# Patient Record
Sex: MALE | Race: WHITE
[De-identification: names, ages, dates, MRNs, and addresses within clinical notes are randomized per-mention and may not be internally consistent; named-entity substitution may affect disease eponyms.]

---

## 2017-02-24 ENCOUNTER — HOSPITAL ENCOUNTER (OUTPATIENT)
Dept: HOSPITAL 45 - C.CTS | Age: 67
Discharge: HOME | End: 2017-02-24
Attending: OTOLARYNGOLOGY
Payer: COMMERCIAL

## 2017-02-24 DIAGNOSIS — J34.2: Primary | ICD-10-CM

## 2017-02-24 DIAGNOSIS — J32.9: ICD-10-CM

## 2017-02-24 DIAGNOSIS — J31.0: ICD-10-CM

## 2017-02-24 NOTE — DIAGNOSTIC IMAGING REPORT
FUSION CT SINUSES W/O



CLINICAL HISTORY: J31.0 Chronic dkmhofusP22.9 Chronic noplqhvwkH87.2 nasal

septal deviation



COMPARISON STUDY:  No previous studies for comparison.



FINDINGS: Helical images were acquired in the transverse plane. Sagittal and

coronal reformatted images were reviewed.



No orbital lesions are visualized. There is no hydrocephalus.



The mastoid air cells appear symmetrically aerated. The middle ear cavities

appear well aerated.



There is minor bilateral maxillary sinus mucosal thickening.



There is minimal ethmoid and frontal sinus mucosal thickening.



The ostiomeatal units are patent bilaterally. There is minor nasal septal

deviation to the right.



The olfactory recesses measures 6 mm in depth.



The lamina papyracea are intact.



The anterior ethmoidal notches are protected.



There is a left-sided Benjy cell.



IMPRESSION:  

1. No evidence of acute sinusitis

2. Minor mucosal thickening

3. The ostiomeatal units are patent bilaterally 









Electronically signed by:  Andrew Pabon M.D.

2/24/2017 9:51 AM



Dictated Date/Time:  2/24/2017 9:47 AM

## 2017-02-27 ENCOUNTER — HOSPITAL ENCOUNTER (EMERGENCY)
Dept: HOSPITAL 45 - C.EDB | Age: 67
Discharge: HOME | End: 2017-02-27
Payer: COMMERCIAL

## 2017-02-27 VITALS
WEIGHT: 218.92 LBS | HEIGHT: 69.02 IN | BODY MASS INDEX: 32.42 KG/M2 | WEIGHT: 218.92 LBS | HEIGHT: 69.02 IN | BODY MASS INDEX: 32.42 KG/M2

## 2017-02-27 VITALS — OXYGEN SATURATION: 96 % | DIASTOLIC BLOOD PRESSURE: 83 MMHG | HEART RATE: 90 BPM | SYSTOLIC BLOOD PRESSURE: 147 MMHG

## 2017-02-27 VITALS — TEMPERATURE: 98.6 F
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
## 2017-02-27 DIAGNOSIS — S05.01XA: Primary | ICD-10-CM

## 2017-02-28 NOTE — EMERGENCY ROOM VISIT NOTE
ED Visit Note


First contact with patient:  18:09


CHIEF COMPLAINT:  Eye pain








HISTORY OF PRESENT ILLNESS:  This 66-year-old male patient presents to the 

emergency department complaining of pain in the right eye for the past 2 days. 

There has been a constant moderate pain and irritation, redness and tearing in 

the eye.  There is a mild blurring of vision at times and light bothers the 

eye.  The vision has not been decreased over all.  The patient does not wear 

contacts.  The patient rates the pain as dull and 5/10.  The patient has not 

had previous injuries to this eye.  Tetanus shot is reportedly up to date.








REVIEW OF SYSTEMS: A 6 system review of systems was completed with positives 

and pertinent negatives listed in the HPI. 








ALLERGIES: Penicillins








MEDICATIONS: See EMR








PMH: See EMR








SOCIAL HISTORY: Lives locally








PHYSICAL EXAM: Vital Signs: Reviewed Nurse's notes, vital signs stable.  Visual 

acuity 20/20 bilateral.  GENERAL: This is a white male, in no acute distress, 

but who is uncomfortable from the eye problem.  Well-developed well-nourished.  

EYES:  The pupils are equal round and reactive to light and accommodation.  

EOMs are full and without tenderness.  There is discharge of clear tears from 

the right eye which is injected.  There is no foreign body visible under the 

eyelid even after lid eversion.  Funduscopic exam reveals no hemorrhages, 

papilledema, or other abnormalities.  No foreign body was seen embedded in the 

cornea under slit lamp exam.  The cornea was clear and no hyphema was seen.  

Fluorescein uptake was observed with ultraviolet light significant for a 

corneal abrasion at 9:00.








EMERGENCY DEPARTMENT COURSE:  Physical exam and history were performed.  

Nursing notes and EMR were reviewed.  The patient appears to have a corneal 

abrasion of his right eye.  He was started on Ciloxan eyedrops and given 

instructions to follow with his eye doctor or primary care physician with any 

ongoing or persistent symptoms.





Allergies


Coded Allergies:  


     Penicillins (Verified  Allergy, Unknown, 2/5/10)





Vital Signs











  Date Time  Temp Pulse Resp B/P Pulse Ox O2 Delivery O2 Flow Rate FiO2


 


2/27/17 19:06  90 18 147/83 96   


 


2/27/17 17:47 37.0 90 18 153/89 96 Room Air  











Medications Administered











 Medications


  (Trade)  Dose


 Ordered  Sig/Jaya


 Route  Start Time


 Stop Time Status Last Admin


Dose Admin


 


 Ciprofloxacin HCl


  (Ciprofloxacin


 0.3% Op Soln)  2 drops  NOW  ONCE


 OP  2/27/17 19:00


 2/27/17 19:01 DC 2/27/17 19:03


37 DROPS











Departure Information


Impression





 Primary Impression:  


 Corneal abrasion, right





Dispostion


Home / Self-Care





Condition


GOOD





Forms


HOME CARE DOCUMENTATION FORM,                                                 

               IMPORTANT VISIT INFORMATION





Patient Instructions


My [facility]





Additional Instructions





You were seen and evaluated today on an emergency basis only.  This is not a 

substitute for, or an effort to provide, complete comprehensive medical care.  

It is not possible to recognize and treat all injuries or illnesses in a single 

emergency department visit. For this reason it is recommended that you followup 

with your primary care physician or eye doctor this week for ongoing care and 

evaluation.





Use Ciloxan Eye Drops: Instill 1-2 drops into the conjunctival sac every 2 

hours while awake for 2 days and 1-2 drops every 4 hours while awake for the 

next 5 days





For baseline pain relief you may alternate ibuprofen and acetaminophen every 4 

hours for pain control. Take 600 mg ibuprofen (Advil) and then 4 hours later 

take 1000 mg acetaminophen (Tylenol). Do not take more than 3000 mg 

acetaminophen in a single day.  





You are welcome to return to the emergency department anytime with new, 

worsening, or concerning symptoms.

## 2017-07-31 ENCOUNTER — HOSPITAL ENCOUNTER (OUTPATIENT)
Dept: HOSPITAL 45 - C.LAB | Age: 67
Discharge: HOME | End: 2017-07-31
Attending: INTERNAL MEDICINE
Payer: COMMERCIAL

## 2017-07-31 ENCOUNTER — HOSPITAL ENCOUNTER (OUTPATIENT)
Dept: HOSPITAL 45 - C.RAD1850 | Age: 67
Discharge: HOME | End: 2017-07-31
Attending: INTERNAL MEDICINE
Payer: COMMERCIAL

## 2017-07-31 DIAGNOSIS — M54.2: ICD-10-CM

## 2017-07-31 DIAGNOSIS — Z12.5: ICD-10-CM

## 2017-07-31 DIAGNOSIS — E29.1: ICD-10-CM

## 2017-07-31 DIAGNOSIS — M54.5: ICD-10-CM

## 2017-07-31 DIAGNOSIS — M25.511: Primary | ICD-10-CM

## 2017-07-31 DIAGNOSIS — R73.09: Primary | ICD-10-CM

## 2017-07-31 DIAGNOSIS — R73.09: ICD-10-CM

## 2017-07-31 LAB
ALBUMIN/GLOB SERPL: 1.3 {RATIO} (ref 0.9–2)
ALP SERPL-CCNC: 60 U/L (ref 45–117)
ALT SERPL-CCNC: 25 U/L (ref 12–78)
ANION GAP SERPL CALC-SCNC: 7 MMOL/L (ref 3–11)
AST SERPL-CCNC: 26 U/L (ref 15–37)
BASOPHILS # BLD: 0.07 K/UL (ref 0–0.2)
BASOPHILS NFR BLD: 1.1 %
BUN SERPL-MCNC: 21 MG/DL (ref 7–18)
BUN/CREAT SERPL: 22.7 (ref 10–20)
CALCIUM SERPL-MCNC: 8.6 MG/DL (ref 8.5–10.1)
CHLORIDE SERPL-SCNC: 106 MMOL/L (ref 98–107)
CHOLEST/HDLC SERPL: 4 {RATIO}
CO2 SERPL-SCNC: 27 MMOL/L (ref 21–32)
COMPLETE: YES
CREAT SERPL-MCNC: 0.91 MG/DL (ref 0.6–1.4)
CREAT UR-MCNC: 110 MG/DL
EOSINOPHIL NFR BLD AUTO: 265 K/UL (ref 130–400)
GLOBULIN SER-MCNC: 2.8 GM/DL (ref 2.5–4)
GLUCOSE SERPL-MCNC: 100 MG/DL (ref 70–99)
GLUCOSE UR QL: 47 MG/DL
HCT VFR BLD CALC: 44 % (ref 42–52)
IG%: 0.2 %
IMM GRANULOCYTES NFR BLD AUTO: 32.4 %
KETONES UR QL STRIP: 114 MG/DL
LYMPHOCYTES # BLD: 2.14 K/UL (ref 1.2–3.4)
MCH RBC QN AUTO: 29.3 PG (ref 25–34)
MCHC RBC AUTO-ENTMCNC: 33.6 G/DL (ref 32–36)
MCV RBC AUTO: 87.1 FL (ref 80–100)
MONOCYTES NFR BLD: 14.8 %
NEUTROPHILS # BLD AUTO: 3.2 %
NEUTROPHILS NFR BLD AUTO: 48.3 %
NITRITE UR QL STRIP: 147 MG/DL (ref 0–150)
PH UR: 190 MG/DL (ref 0–200)
PMV BLD AUTO: 10.8 FL (ref 7.4–10.4)
POTASSIUM SERPL-SCNC: 4.1 MMOL/L (ref 3.5–5.1)
PSA SERPL-MCNC: 1.96 NG/ML (ref 0–4)
RATIO: 12.5 MCG/MG (ref 0–30)
RBC # BLD AUTO: 5.05 M/UL (ref 4.7–6.1)
SODIUM SERPL-SCNC: 140 MMOL/L (ref 136–145)
TIBC SERPL-MCNC: 322 MCG/DL (ref 250–450)
TSH SERPL-ACNC: 1.31 UIU/ML (ref 0.3–4.5)
VERY LOW DENSITY LIPOPROT CALC: 29 MG/DL
WBC # BLD AUTO: 6.6 K/UL (ref 4.8–10.8)

## 2017-08-09 NOTE — CODING QUERY MEDICAL NECESSITY
SUPPORTING DIAGNOSIS NEEDED





A supporting diagnosis is required for the test/procedure performed on this patient in 
order for us to be reimbursed by the patient's insurance. Please provide a supporting 
diagnosis for the following test/procedure listed below next to the test name along with 
your signature. 



*If there is no additional diagnosis for this patient that would support the following 
test/procedure please document that below next to the test/procedure.



Test(s)/Procedure(s) that require a supporting diagnosis:







* VITAMIN D, 25-HYDROXY                      DIAGNOSIS:







Provider Signature:  ______________________________  Date:  _______



Thank you  

Irasema Rivera

[employer] Information [facility]

Phone:  [phone]

Fax:  [phone]



Once completed, please kindly fax back to [phone]



For questions please call [phone]

## 2017-08-09 NOTE — CODING QUERY MEDICAL NECESSITY
SUPPORTING DIAGNOSIS NEEDED





A supporting diagnosis is required for the test/procedure performed on this patient in 
order for us to be reimbursed by the patient's insurance. Please provide a supporting 
diagnosis for the following test/procedure listed below next to the test name along with 
your signature. 



*If there is no additional diagnosis for this patient that would support the following 
test/procedure please document that below next to the test/procedure.





Test(s)/Procedure(s) that require a supporting diagnosis:





* PSA                              DIAGNOSIS:







Provider Signature:  ______________________________  Date:  _______



Thank you  

Irasema Rivera

[employer] Information [facility]

Phone:  [phone]

Fax:  [phone]



Once completed, please kindly fax back to [phone]



For questions please call [phone]

## 2017-09-11 ENCOUNTER — HOSPITAL ENCOUNTER (OUTPATIENT)
Dept: HOSPITAL 45 - C.LAB | Age: 67
Discharge: HOME | End: 2017-09-11
Attending: INTERNAL MEDICINE
Payer: COMMERCIAL

## 2017-09-11 DIAGNOSIS — E83.19: Primary | ICD-10-CM

## 2017-09-28 ENCOUNTER — HOSPITAL ENCOUNTER (OUTPATIENT)
Dept: HOSPITAL 45 - C.RDSM | Age: 67
Discharge: HOME | End: 2017-09-28
Attending: PHYSICIAN ASSISTANT
Payer: COMMERCIAL

## 2017-09-28 DIAGNOSIS — M79.671: Primary | ICD-10-CM

## 2017-09-28 NOTE — DIAGNOSTIC IMAGING REPORT
R FOOT MIN 3 VIEWS



CLINICAL HISTORY: 67 years-old Male presenting with RIGHT FOOT PAIN. 



TECHNIQUE: Frontal, oblique, and lateral views the right foot were obtained. 



COMPARISON:  3/15/2012.



FINDINGS:

Severe degenerative change of the first metatarsophalangeal joint with complete

joint space loss, osteophytosis, and subchondral sclerosis. Bone spur noted at

the inferior calcaneus and at the insertion of the Achilles tendon. No acute

fracture or malalignment. No radiographic evidence of soft tissue abnormality.



IMPRESSION:

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







Electronically signed by:  Quinton Murray M.D.

9/28/2017 3:20 PM



Dictated Date/Time:  9/28/2017 3:19 PM

## 2017-11-15 ENCOUNTER — HOSPITAL ENCOUNTER (OUTPATIENT)
Dept: HOSPITAL 45 - X.SURG | Age: 67
Discharge: HOME | End: 2017-11-15
Attending: SPECIALIST
Payer: COMMERCIAL

## 2017-11-15 VITALS — TEMPERATURE: 97.7 F

## 2017-11-15 VITALS — OXYGEN SATURATION: 96 % | DIASTOLIC BLOOD PRESSURE: 78 MMHG | SYSTOLIC BLOOD PRESSURE: 120 MMHG | HEART RATE: 68 BPM

## 2017-11-15 VITALS
HEIGHT: 67.99 IN | WEIGHT: 215.46 LBS | HEIGHT: 67.99 IN | BODY MASS INDEX: 32.65 KG/M2 | BODY MASS INDEX: 32.65 KG/M2 | WEIGHT: 215.46 LBS

## 2017-11-15 DIAGNOSIS — H25.12: Primary | ICD-10-CM

## 2017-11-15 RX ADMIN — TROPICAMIDE SCH DROP: 10 SOLUTION/ DROPS OPHTHALMIC at 07:23

## 2017-11-15 RX ADMIN — CYCLOPENTOLATE HYDROCHLORIDE SCH DROP: 10 SOLUTION/ DROPS OPHTHALMIC at 07:19

## 2017-11-15 RX ADMIN — MOXIFLOXACIN HYDROCHLORIDE SCH DROP: 5 SOLUTION/ DROPS OPHTHALMIC at 07:37

## 2017-11-15 RX ADMIN — PHENYLEPHRINE HYDROCHLORIDE SCH DROP: 25 SOLUTION/ DROPS OPHTHALMIC at 07:22

## 2017-11-15 RX ADMIN — PHENYLEPHRINE HYDROCHLORIDE SCH DROP: 25 SOLUTION/ DROPS OPHTHALMIC at 07:17

## 2017-11-15 RX ADMIN — MOXIFLOXACIN HYDROCHLORIDE SCH DROP: 5 SOLUTION/ DROPS OPHTHALMIC at 07:20

## 2017-11-15 RX ADMIN — TROPICAMIDE SCH DROP: 10 SOLUTION/ DROPS OPHTHALMIC at 07:18

## 2017-11-15 RX ADMIN — CYCLOPENTOLATE HYDROCHLORIDE SCH DROP: 10 SOLUTION/ DROPS OPHTHALMIC at 07:24

## 2017-11-15 NOTE — [facility] OPERATIVE REPORT
Operative Report


Date of Service


Nov 15, 2017.





Operative Report


1.  PREOPERATIVE DIAGNOSIS:  Senile nuclear cataract, left eye.  





2.  POSTOPERATIVE DIAGNOSIS:  Senile nuclear cataract, left eye.  





3.  PROCEDURE:  Phacoemulsification of left cataract with posterior chamber 

lens implant, type [employer] Lomb, model MI60L, power +24.5 diopters. 





ANESTHESIA:   Local standby.  SURGEON:  Dr. Brady. COMPLICATIONS:  None.  

OPERATING TIME:  10 minutes.  





4.  OPERATION AND FINDINGS: 





DESCRIPTION OF PROCEDURE:  The left pupil was dilated.  The anesthetic was 

administered using a topical technique.  The left eye was prepped and draped.  

A speculum was placed.  A clear corneal incision was formed.  The chamber was 

filled with Amvisc Plus and Endocoat.  Epinephrine solution was used.  A 

paracentesis was placed.  A capsulorrhexis was performed.  The nucleus was 

hydrodissected.  The lens was removed with phacoemulsification.  Time was 2.70 

seconds.  The aspiration unit was used to remove the cortex.  The capsule was 

filled with Amvisc Plus.  The lens implant was folded and placed into the 

capsule.  The incision was hydrated.  The Amvisc was aspirated.  The wound was 

secure.  The chamber was deep.  The pupil was round.  Brimonidine, TobraDex 

ointment and Vigamox solution were placed.  The speculum was removed.  The 

patient was returned to the Recovery Room in stable condition.


I attest to the content of the Intraoperative Record and any orders documented 

therein.  Any exceptions are noted below.


The scribe's documentation has been prepared in my presence, under my direction 

and personally reviewed by me in its entirety. I confirm that the note above 

accurately reflects all work, treatment, procedures, and medical decision 

making performed by me.








I personally scribed for Mateo Brady M.D. (JEAN CLAUDE) on 11/15/17 at 08:20.  

Electronically submitted by Johana Phillips (MARLIN).

## 2017-11-15 NOTE — DISCHARGE INSTRUCTIONS-SURGCTR
Discharge Instructions


Date of Service


Nov 15, 2017.





Visit


Reason for Visit:  Cataract Left Eye





Discharge


Discharge Diagnosis / Problem:  lens implant left eye





Discharge Goals


Goal(s):  Improve function





Activity Recommendations


Activity Limitations:  resume your previous activity


Lifting Limitations:  no more than 10 pounds


Exercise/Sports Limitations:  gradually increase as tolerated


May Resume Sexual Activity:  when tolerated


Shower/Bathe:  tomorrow


Driving or Machine Use:  resume 1 day after discharge





Anesthesia


.





Post Anesthesia Instructions:





If you have had General Anesthesia or IV Sedation:





*  Do not drive today.


*  Resume driving when surgeon permits.


*  Do not make important decisions or sign legal documents today.


*  Call surgeon for:





   1.  Temperature elevations greater than 101 degrees F.


   2.  Uncontrollable pain.


   3.  Excessive bleeding.


   4.  Persistent nausea and vomiting.


   5.  Medication intolerance (nausea, vomiting or rash).





*  For nausea and vomiting use only clear liquids such as: tea, soda, bouillon 

until nausea subsides, then gradually increase diet as tolerated.





*  If you have any concerns or questions, call your surgeon's office.  If 

physician is unavailable and it is an emergency, call 911 or go to the nearest 

emergency room.





.





Instructions / Follow-Up


Instructions / Follow-Up





ACTIVITY RECOMMENDATIONS:





*  Light activities.





*  Mild irritation and blurred vision are common for the first few days.





*  You may walk outside, read, watch television.





*  Redness around the white part of the eye is common.








MEDICATIONS:





Resume previous medications unless instructed otherwise by your surgeon.





Start all eye drops at 1 pm today:





*  Eye drops (today and tomorrow):


   Prednisone - one drop in operative eye every 3 hours while awake


            Ofloxacin - one drop in operative eye every  3 hours while awake


   


SPECIAL CARE INSTRUCTIONS:





*  Tape plastic shield over eye to sleep at night.  





Call your doctor at [phone] with any concerns or problems.








FOLLOW UP VISIT:





Follow-up with Dr Brady at [place] office as scheduled.





Diet Recommendations


Home Diet:  no limitations





Procedures


Procedures Performed:  


cataract extraction with lens implant





Pending Studies


Studies pending at discharge:  no





Medical Emergencies








.


Who to Call and When:





Medical Emergencies:  If at any time you feel your situation is an emergency, 

please call 911 immediately.





.





Non-Emergent Contact


Non-Emergency issues call your:  Ophthalmologist


Call Non-Emergent contact if:  your pain is not controlled


[phone]


.


.








"Provider Documentation" section prepared by Mateo Brady.








.

## 2017-11-15 NOTE — ANESTHESIA PROGRESS NT - [facility]
Anesthesia Post Op Note


Date & Time


Nov 15, 2017 at 08:47





Vital Signs


Pain Intensity:  0





Vital Signs Past 12 Hours








  Date Time  Temp Pulse Resp B/P (MAP) Pulse Ox O2 Delivery O2 Flow Rate FiO2


 


11/15/17 08:44  68 16 120/78 (92) 96 Room Air  


 


11/15/17 08:19 36.5 68 20 130/73 (92) 94 Room Air  


 


11/15/17 07:06 36.4 70 16 158/85 (109) 96 Room Air  











Notes


Mental Status:  alert / awake / arousable, participated in evaluation


Pt Amnestic to Procedure:  Yes


Nausea / Vomiting:  adequately controlled


Pain:  adequately controlled


Airway Patency, RR, SpO2:  stable & adequate


BP & HR:  stable & adequate


Hydration State:  stable & adequate


Anesthetic Complications:  no major complications apparent

## 2018-01-11 ENCOUNTER — HOSPITAL ENCOUNTER (OUTPATIENT)
Dept: HOSPITAL 45 - C.RAD | Age: 68
Discharge: HOME | End: 2018-01-11
Attending: PHYSICIAN ASSISTANT
Payer: COMMERCIAL

## 2018-01-11 DIAGNOSIS — R05: Primary | ICD-10-CM

## 2018-01-11 NOTE — DIAGNOSTIC IMAGING REPORT
(BARIUM SWALLOW) ESOPHAGUS



CLINICAL HISTORY: R05 CoughPATIENT COUGHING UP WHOLE CHUNKS OF FOOD.

Regurgitation



COMPARISON STUDY:  None



FLUOROSCOPY TIME: 1.3 minutes.



NUMBER OF FLUOROSCOPIC IMAGES:  21



FINDINGS: The patient swallowed effervescent granules and barium without

difficulty. Rapid sequence swallows reveal no evidence of aspiration. No

esophageal masses or ulcerations were visualized. The patient swallowed one half

barium tablet which freely passed the stomach. There is disordered esophageal

motility.



IMPRESSION:  Disordered esophageal motility. Otherwise normal study. 







Electronically signed by:  Andrew Pabon M.D.

1/11/2018 1:34 PM



Dictated Date/Time:  1/11/2018 8:22 AM

## 2018-01-17 ENCOUNTER — HOSPITAL ENCOUNTER (OUTPATIENT)
Dept: HOSPITAL 45 - C.RAD1850 | Age: 68
Discharge: HOME | End: 2018-01-17
Attending: PHYSICIAN ASSISTANT
Payer: COMMERCIAL

## 2018-01-17 DIAGNOSIS — R05: Primary | ICD-10-CM

## 2018-01-17 NOTE — DIAGNOSTIC IMAGING REPORT
CHEST 2 VIEWS ROUTINE



CLINICAL HISTORY: R05 [id number]    



COMPARISON STUDY:  12/7/2016



FINDINGS: The cardiac and mediastinal contours remain stable. Soft tissue

prominence the lower right paraspinal region remains unchanged, and is therefore

of doubtful acute clinical significance. There is no failure. There is no acute

parenchymal consolidation. There are no pleural effusions.[ 



IMPRESSION: No active disease in the chest.







Electronically signed by:  Andrew Pabon M.D.

1/17/2018 2:18 PM



Dictated Date/Time:  1/17/2018 2:17 PM

## 2018-01-19 ENCOUNTER — HOSPITAL ENCOUNTER (OUTPATIENT)
Dept: HOSPITAL 45 - C.LAB1850 | Age: 68
Discharge: HOME | End: 2018-01-19
Attending: PHYSICIAN ASSISTANT
Payer: COMMERCIAL

## 2018-01-19 DIAGNOSIS — R05: Primary | ICD-10-CM

## 2018-02-21 ENCOUNTER — HOSPITAL ENCOUNTER (OUTPATIENT)
Dept: HOSPITAL 45 - C.RDSM | Age: 68
Discharge: HOME | End: 2018-02-21
Attending: ORTHOPAEDIC SURGERY
Payer: COMMERCIAL

## 2018-02-21 DIAGNOSIS — M79.673: ICD-10-CM

## 2018-02-21 DIAGNOSIS — M19.90: Primary | ICD-10-CM

## 2018-02-21 NOTE — DIAGNOSTIC IMAGING REPORT
R FOOT MIN 3 VIEWS



CLINICAL HISTORY: RIGHT FOOT PAIN     



COMPARISON: 9/28/2017



DISCUSSION: No acute fractures are visualized. There is are small calcaneal

spurs. There are progressive advanced arthritic changes at the level of the

first metatarsal phalangeal joint.    



IMPRESSION: Severe, mildly progressive arthritic changes at the level the first

metatarsal phalangeal joint







Electronically signed by:  Andrew Pabon M.D.

2/21/2018 2:23 PM



Dictated Date/Time:  2/21/2018 2:22 PM

## 2018-03-17 ENCOUNTER — HOSPITAL ENCOUNTER (OUTPATIENT)
Dept: HOSPITAL 45 - C.NEUR | Age: 68
Discharge: HOME | End: 2018-03-17
Attending: PHYSICIAN ASSISTANT
Payer: COMMERCIAL

## 2018-03-17 DIAGNOSIS — I10: ICD-10-CM

## 2018-03-17 DIAGNOSIS — R06.83: ICD-10-CM

## 2018-03-17 DIAGNOSIS — G47.10: Primary | ICD-10-CM

## 2018-03-17 DIAGNOSIS — R25.8: ICD-10-CM

## 2018-03-18 NOTE — PAP/PSG TECHNICIAN REPORT
[facility]

Technician Polysomnogram Report



Study name:

None

Report date:

3/18/2018



Study date:

3/17/2018

Referring Physician:

Irasema Rocha PA-C, PA-C



Name:

STARR SWANSON

Interpreting Physician:

Ori Figueroa M.D.



YOB: 1950

Technician:

Kelli [facility].



Sex:

Male







Age:

67

StudyType:

PSG



Weight:

221 lbs









Height:

67 years, Height 5' 7"

Neck Circum:16.5inches







BMI:

34.61







Medications:

Pantoprazole Sodium 40mg, Ranitidine HCl 30mg, EQ Allergy Relief D 10-240mg, Bupropion HCl 150mg, 
Citalopram Hydrobromide 20mg, ASA 81mg, Benzonatate 100mg, Celoxib 200mg, Multivitamin, Valacyclovir 
HCl 1gm, Fortesta 10mg/act gelHydroxyzine HCl 10mg















Patient History





Study started on room air with no ETCO2 monitoring in room #6. 67 yr old male here tonight for a 
diagnostic psg. He complains of EDS, loud snoring and restless legs. He said that he thrashes around 
all night. He stated that he had a sleep study years ago but he was never placed on cpap. His 
ESS=12/24. Neck circ=16.5inches.







Parameters Monitored

NPSG: E1-M2, E2-M1, Fp1-M2, Fp2-M1, F3-M2, F4-M2, F4-M1, C3-M2, C4-M2, C4-M1, O1-M2, O2-M2,

O2-M1, T3-M2, T4-M1, P3-M2, P4-M1, CHIN1, CHIN2, HR, EKG, Legs, PFLOW, SNOR, FLOW, CFLOW,

Tidal Volume, THOR, ABDO, SpO2, PLTH, CPRESS, ETCO2 Wave, ETCO2, pH





Sleep Architecture



Sleep Stages



Time at Lights Off

11:08:48 PM



STAGES

Time (min.)

TST (%)



Time at Lights On

5:39:48 AM



Wake

146.5

--



Total Recording Time (TRT)

391.00 min.



N1

30.5

12



Total Sleep Period (TSP)

349.0 min.



N2

154.0

63



Total Sleep Time (TST)

244.5min.



N3

36.0

15



Awake Time

146.5 min.



REM

24.0

10



Wake after Sleep Onset

104.5 min.











Sleep Efficiency (SE)

63 %











Sleep Onset Latency (SOL)

42.0 min.











Number of Stage 1 Shifts

None











Awakenings

41











Stage Changes

149











Number of REM periods

10



REM

24.0

10



REM Latency

247.0 min.



NREM

220.5

90





Body Position Analysis





Supine

Right

Left

Side

Prone

Vertical



Total Sleep Time (min.)

151.6

73.9

102.5

176.40

0.0

0.0



Total Sleep Time (%)

28%

30%

42%

72

0%

N/A%



Total Sleep Time REM (min.)

22.5

1.5

0.0

None

0.0

0.0



Total Sleep Time NREM (min.)

45.6

72.4

102.5

None

0.0

0.0



Intermittent Wake (min.)

83.5

30.8

32.2

None

0.0

0.0



Total Sleep Period (%)

31%

None





Arousals







Myoclonus (PLM) *







Events

Count

Index



Events

Count

Index



Spontaneous

11

3



Events Awake (PLMW)

390

159.7



Respiratory

24

6.4



Events Asleep w/ Arousal (PLMA)

60

14.7



PLM

55

15



Events Asleep w/o Arousal (PLMS)

485

119.0



Snoring

3

1



Total Asleep

545

133.7



Total

93

23



Total

935

143







Respiratory Analysis *





CA

OA

MA

CH

H

RERA

Total



Count

0

8

0

0

46

0

54



Index

0.0

2.0

0.0

0

11.3

0

13.3



Mean Duration

0.0

21.3

0.0

0.00

26.8

0.0

26.0



Longest Duration

0.0

28.4

0.0

0.00

0.0

0.0

54.8







Respiratory Event Summary 







Total

Supine

~Supine

Right

Left

Prone

REM

NREM



Apneas

Count

8

6

2

2

0

N/A

1

7





Index

2.0

5

1

1.6

0.0

N/A

3

2



Hypopneas (4% Desat)

Count

46

37

9

4

5

N/A

16

30





Index

11.3

32.6

3

3.2

2.9

N/A

40.0

8.2



Apneas & All Hypopneas 

Count

54

43

11

6

5

N/A

17

37





Index

13.3

38

4

5

3

N/A

42.5

10.1



Respiratory Events 

(Apn+All Hyp+RERA)

Count

54

43

11

6

5

N/A

17

37





Index

13.3

38

4

4.9

2.9

N/A

42.5

10.1



Respiratory Related Arousal

Count

24

43

4

3

1

N/A

5

21





Index

6.4

19

1

2

1

N/A

13

6





Snoring Analysis





Supine

Right

Left

Prone

REM

NREM

Total



Snore duration

6.2 min



Snores count

80

48

16

N/A

48

96

144



Snore mean duration

2.6 Sec



Snores index

70

39

9

N/A

120.0

26.1

35.3



TST with snoring (%)

2.6%







Desaturation Event Summary:



Minimum %SpO2

Event Count

Mean/Min/Max Duration(sec.)

Desaturation Index

% Time In Bed



> 90

85

24.0 / 8.5 / 57.0

16.0

85.5



86 - 90

15

15.6 / 7.0 / 25.3

18.1

13.4



81 - 85

1

4.5 / 4.5 / 4.5

14.4

1.1



76 - 80

0

N/A

0.0

0.0



71 - 75

0

N/A

0.0

0.0



66 - 70

0

N/A

0.0

0.0



61 - 65

0

N/A

0.0

0.0



56 - 60

0

N/A

0.0

0.0



51 - 55

0

N/A

0.0

0.0



< 50

0

N/A

0.0

0.0









Total

REM

NREM

Awake



<50%

0.0 min.



51 - 60%

0.0 min.



61 - 70%

0.0 min.



71 - 80%

0.0 min.



81 - 90%

53.8 min.

12.5 min.

33.9 min.

7.4 min.



91 - 100%

317.9 min.

11.4 min.

182.5 min.

124.0 min.



Average

93

90

92

93



Minimum SpO2

81

84

81

81



Desaturation Event Index

14.1

57.5

12.8

9.4



# Desat. Events below 89%

42

23

12

7



Time(%) with Saturation below 89%

9.0

1.8

6.3

0.9



Time(min.) with Saturation below 89%

33.3

6.6

23.5

3.2









Time (mins)

REM (mins)

NREM (mins)

% of TST



SpO2 Below 90%

50

23

N27

14.4



SpO2 Below 88%

13

0

0

9





Heart Rate Analysis









Min (bpm)

Max (bpm)

Average (bpm)



Awake

52

180

64



NREM

51

74

61



REM

51

70

59



Overall

51

74

61













Supplemental O2 Values



Minimum O2 level: None



Value  

Start Time

End Time





Technician Comments





Mr. Swanson slept in the right, left and supine positions.  No cardiac arrhythmia noted.  PLM's 
were noted. His arms and legs moved all night.  No bruxism noted.  Snoring was noted and scored as a 
2 on a scale of 1 through 5. (0=no snoring, 5=snoring loud enough to be heard through a closed door 
or down the [address]).  He awoke to use the restroom 1 time during the night.  He stated that he 
slept a little worse than usual.

The final report will be interpreted and signed by a sleep physician. The completed physician report 
will then be placed in the patient medical record.







 



 



 



 



 



 



 



 

 



Therapy (cm H2O)

0



TIB (min.)

391.0



TST (min.)

244.5



Sleep Onset (min.)

42.0



REM Onset From Sleep (min.)

247.0



Sleep Efficiency %

63



Wakefulness (%)

37



Wakefulness (min.)

146.5



NREM 1 (%)

12



NREM 1 (min.)

30.5



NREM 2 (%)

63



NREM 2 (min.)

154.0



NREM 3 (%)

15



NREM 3 (min.)

36.0







REM (%)

10



REM (min.)

24.0



# Arousals

93



Arousal Index

23



# Snore

144



Snore Index

35.3



AHI

13.3



AHI Supine

38



AHI Non-Supine

4



NREM AHI

10.1



REM AHI

42.5



RDI

13.3



# Obstructive Apnea

8



# Central Apnea

0



# Mixed Apnea

0







# Hypopneas

46



RERAs

0



Total Respiratory Events

58



Time Below SpO2 89% (min.)

30.1



Mean NREM SpO2 (%)

92



Mean REM SpO2 (%)

90



Mean Sleep SpO2 (%)

92



Min NREM SpO2 (%)

81



Min REM SpO2 (%)

84



Position Supine (min.)

151.6



Position Non-supine (min.)

176.4



LM Index Sleep

133.7



LM Index NREM

143.1



LM Index REM

47.5







Mean Heart Rate (bpm)

61



Min Heart Rate (bpm)

51

## 2018-03-19 NOTE — POLYSOMNOGRAPH REPORT
CLINICAL DATA:  A 67-year-old male with BMI of 34.6 referred by Irasema Rocha and Dr. Galan with complaints of excessive daytime sleepiness, loud

snoring, and leg movements at night.  He states he thrashes throughout the

night.  He is tired through the day.  His [place] sleepiness score is 12/24.

 

SLEEP ARCHITECTURE:  Total sleep period was 349 minutes.  Total sleep time

was 244.5 minutes divided between 220.5 minutes of non-REM sleep and 24

minutes of REM sleep.  Sleep onset latency was delayed at 42 minutes.  REM

latency was 247 minutes.  Sleep efficiency was reduced at 63%.  Wake after

sleep onset was 104.5 minutes.  Sleep consisted of stage N1 12%, stage N2

63%, stage N3 15%, and REM 10%.

 

AROUSAL DATA:  93 arousals were recorded for an index of 23 per hour.  55

were due to PLMs.  24 were due to respiratory events.

 

PLM DATA:  Very severe PLMD was noted.  There were 545 limb movements during

sleep noted for an index of 103.7 per hour with arousal index of 14.7 per

hour.

 

RESPIRATORY DATA:  Mild sleep apnea was documented.  The AHI was 13.  There

were 46 hypopneic episodes with a mean duration of 26.8 seconds.  There were

8 obstructive apneic episodes.  The longest apneic episode was 28 seconds.

 

OXIMETRY DATA:  Nocturnal hypoxemia was seen.  Oxygen abigail was 81% during

non-REM sleep.  Mean saturation was 93%.  Time below 88% was 13 minutes.

 

EKG:  Heart rates ranged from 51-74 beats per minute.  No arrhythmias were

noted.

 

TECHNICIAN'S COMMENTS:  The patient slept in the right, left, and supine

position.  His arms and legs moved throughout the entire night.  Severe PLMs

were noted with frequent arousals and awakenings.  Snoring was mild, rated 2

on a scale of 1-5.  No bruxism was noted.

 

IMPRESSION:

1.  Mild sleep apnea/hypopnea with an AHI of 13.

2.  Severe PLMD with a PLM index of 134 per hour with an arousal index of 15

per hour.

 

RECOMMENDATIONS:  The patient could be considered for treatment of his PLMD

and his mild sleep apnea.  Clinical correlation is needed.

 

 

 

[facility]

## 2018-07-19 ENCOUNTER — HOSPITAL ENCOUNTER (OUTPATIENT)
Dept: HOSPITAL 45 - C.RAD1850 | Age: 68
Discharge: HOME | End: 2018-07-19
Attending: STUDENT IN AN ORGANIZED HEALTH CARE EDUCATION/TRAINING PROGRAM
Payer: COMMERCIAL
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
## 2018-07-19 DIAGNOSIS — S63.512A: ICD-10-CM

## 2018-07-19 DIAGNOSIS — M18.12: ICD-10-CM

## 2018-07-19 DIAGNOSIS — S69.92XA: Primary | ICD-10-CM

## 2018-07-19 NOTE — DIAGNOSTIC IMAGING REPORT
LEFT WRIST 5 VIEWS



HISTORY:      Left wrist pain.



COMPARISON: None.



FINDINGS: There is no fracture or dislocation. Severe osteoarthritis at the

first carpometacarpal joint demonstrated by cartilage space narrowing with

bone-on-bone articulation and marginal osteophytes. Soft tissue swelling within

the wrist. The scaphoid appears intact. The scapholunate interval measures up to

3.6 mm. This is consistent with scapholunate dissociation. Mild osteoarthritis

at the radiocarpal joint. No radiopaque foreign bodies.



IMPRESSION:  



1. Scapholunate dissociation.

2. No fracture or dislocation within the left wrist.

3. Severe osteoarthritis at the first carpometacarpal joint.







Electronically signed by:  Ceasar Mills M.D.

7/19/2018 2:48 PM



Dictated Date/Time:  7/19/2018 2:45 PM

## 2018-07-25 ENCOUNTER — HOSPITAL ENCOUNTER (OUTPATIENT)
Dept: HOSPITAL 45 - C.MRIBC | Age: 68
Discharge: HOME | End: 2018-07-25
Attending: ORTHOPAEDIC SURGERY
Payer: COMMERCIAL
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
## 2018-07-25 DIAGNOSIS — S93.602A: Primary | ICD-10-CM

## 2018-07-25 NOTE — DIAGNOSTIC IMAGING REPORT
MRI OF THE LEFT ANKLE AND HINDFOOT WITHOUT CONTRAST



CLINICAL HISTORY: Persistent left foot pain.    



COMPARISON STUDY:  MRI of the left ankle November 10, 2008 and left foot

radiographs July 23, 2018.



TECHNIQUE: Utilizing a 1.5 Emely magnet and dedicated coil, multiplanar,

multiecho imaging of the left ankle and hindfoot was performed without

intravenous or intraarticular contrast.



FINDINGS: Please note that the MRI of the left forefoot/midfoot will be reported

separately. Alignment of the left ankle is anatomic. There is no evidence for

acute fracture. Irregularity distal fibula may reflect an old, healed fracture.

There is mild subchondral signal abnormality of the lateral talar dome which has

diminished since exam of November 10, 2008. This is likely chronic. There is

marked thickening of the distal Achilles tendon with multiple foci of

intermediate signal intensity. This has progressed since previous MRI of

November 10, 2008. Mild plantar calcaneal spurring is noted. Subtalar joint is

intact. Flexor and peroneal tendons are intact. There is a complete tear with

avulsion of the distal tibialis anterior tendon. Otherwise, the extensor tendons

are intact. There is adjacent soft tissue edema. No suspicious marrow

replacement is present.



IMPRESSION:  



1. Complete tear with avulsion of the distal tibialis anterior tendon with

associated soft tissue edema.



2. Marked thickening of the distal Achilles with foci of intermediate signal

intensity consistent with Achilles tendinopathy.



3. Mild subchondral signal abnormality within the lateral talar dome which has

diminished since exam of November 10, 2008.







Electronically signed by:  Vinicius Morelos M.D.

7/25/2018 2:20 PM



Dictated Date/Time:  7/25/2018 2:08 PM

## 2018-07-25 NOTE — DIAGNOSTIC IMAGING REPORT
L LOWER EXT NONJOINT WITHOUT



CLINICAL HISTORY: 68 years-old Male with LEFT FOOT PAIN-ENTIRE FOOT   ANKLE. 

Acute left foot and ankle pain



COMPARISON: Left foot radiographs 7/23/2018



TECHNIQUE: Multiplanar, multi sequence MRI of the left forefoot was performed

without contrast.  



FINDINGS: 



 localizer images demonstrate no gross abnormality. Moderate osteoarthritis

about the first MTP joint with chondral thinning and marginal osteophytosis.

Mild subcortical cystic change/edema is noted about the lateral and central

joint space. Mild hallux valgus deformity. Moderate general changes involve the

hallux sesamoid articulation with the first metatarsal head. Mild joint space

narrowing with chondral thinning is noted throughout the interphalangeal joints.

No acute fracture or dislocation. Mild chondral thinning with marginal

osteophytosis about the imaged mid foot structures.



Full thickness tear of the tibialis anterior with moderate intrasubstance and

peritendinous edema. The imaged flexor tendons appear intact. No evidence of

perineural fibrosis (Huizar neuroma) or definite intermetatarsal bursitis.

Plantar plates appear to be intact. Moderate subcutaneous edema about the dorsal

foot. Lisfranc ligament is identified and appears intact.



IMPRESSION:

1. Full-thickness tear of the tibialis anterior with moderate intrasubstance and

peritendinous edema, likely reactive.

2. Hallux valgus deformity with moderate first MTP joint osteoarthritis.

3. Subcutaneous edema about the dorsal foot is likely reactive.



The above report was generated using voice recognition software. It may contain

grammatical, syntax or spelling errors.











Electronically signed by:  Deion Gonzalez M.D.

7/25/2018 2:20 PM



Dictated Date/Time:  7/25/2018 2:11 PM

## 2018-08-16 ENCOUNTER — HOSPITAL ENCOUNTER (INPATIENT)
Dept: HOSPITAL 45 - C.EDB | Age: 68
LOS: 2 days | Discharge: HOME | DRG: 378 | End: 2018-08-18
Attending: HOSPITALIST | Admitting: HOSPITALIST
Payer: COMMERCIAL

## 2018-08-16 VITALS
TEMPERATURE: 98.42 F | OXYGEN SATURATION: 100 % | HEART RATE: 77 BPM | SYSTOLIC BLOOD PRESSURE: 133 MMHG | DIASTOLIC BLOOD PRESSURE: 75 MMHG

## 2018-08-16 VITALS
HEART RATE: 69 BPM | TEMPERATURE: 98.42 F | DIASTOLIC BLOOD PRESSURE: 65 MMHG | SYSTOLIC BLOOD PRESSURE: 137 MMHG | OXYGEN SATURATION: 100 %

## 2018-08-16 VITALS
WEIGHT: 192.68 LBS | WEIGHT: 192.68 LBS | BODY MASS INDEX: 30.24 KG/M2 | HEIGHT: 67 IN | BODY MASS INDEX: 30.24 KG/M2 | HEIGHT: 67 IN

## 2018-08-16 VITALS
TEMPERATURE: 98.24 F | HEART RATE: 66 BPM | OXYGEN SATURATION: 100 % | SYSTOLIC BLOOD PRESSURE: 143 MMHG | DIASTOLIC BLOOD PRESSURE: 92 MMHG

## 2018-08-16 VITALS
HEART RATE: 66 BPM | OXYGEN SATURATION: 100 % | TEMPERATURE: 98.24 F | SYSTOLIC BLOOD PRESSURE: 143 MMHG | DIASTOLIC BLOOD PRESSURE: 92 MMHG

## 2018-08-16 DIAGNOSIS — Z88.0: ICD-10-CM

## 2018-08-16 DIAGNOSIS — K22.4: ICD-10-CM

## 2018-08-16 DIAGNOSIS — I10: ICD-10-CM

## 2018-08-16 DIAGNOSIS — K26.4: Primary | ICD-10-CM

## 2018-08-16 DIAGNOSIS — Z82.49: ICD-10-CM

## 2018-08-16 DIAGNOSIS — G25.81: ICD-10-CM

## 2018-08-16 DIAGNOSIS — K21.9: ICD-10-CM

## 2018-08-16 DIAGNOSIS — I25.10: ICD-10-CM

## 2018-08-16 DIAGNOSIS — F32.9: ICD-10-CM

## 2018-08-16 DIAGNOSIS — F41.9: ICD-10-CM

## 2018-08-16 DIAGNOSIS — D62: ICD-10-CM

## 2018-08-16 DIAGNOSIS — N40.0: ICD-10-CM

## 2018-08-16 DIAGNOSIS — Z88.6: ICD-10-CM

## 2018-08-16 DIAGNOSIS — E11.9: ICD-10-CM

## 2018-08-16 DIAGNOSIS — K92.1: ICD-10-CM

## 2018-08-16 DIAGNOSIS — F10.239: ICD-10-CM

## 2018-08-16 DIAGNOSIS — Z96.652: ICD-10-CM

## 2018-08-16 DIAGNOSIS — Z88.2: ICD-10-CM

## 2018-08-16 LAB
ALBUMIN SERPL-MCNC: 3.2 GM/DL (ref 3.4–5)
ALP SERPL-CCNC: 49 U/L (ref 45–117)
ALT SERPL-CCNC: 25 U/L (ref 12–78)
AST SERPL-CCNC: 24 U/L (ref 15–37)
BASOPHILS # BLD: 0.07 K/UL (ref 0–0.2)
BASOPHILS NFR BLD: 0.7 %
BUN SERPL-MCNC: 31 MG/DL (ref 7–18)
CALCIUM SERPL-MCNC: 7.9 MG/DL (ref 8.5–10.1)
CO2 SERPL-SCNC: 26 MMOL/L (ref 21–32)
CREAT SERPL-MCNC: 0.96 MG/DL (ref 0.6–1.4)
EOS ABS #: 0.18 K/UL (ref 0–0.5)
EOSINOPHIL NFR BLD AUTO: 277 K/UL (ref 130–400)
GLUCOSE SERPL-MCNC: 115 MG/DL (ref 70–99)
HCT VFR BLD CALC: 27.2 % (ref 42–52)
HCT VFR BLD CALC: 30 % (ref 42–52)
HGB BLD-MCNC: 10 G/DL (ref 14–18)
HGB BLD-MCNC: 8.8 G/DL (ref 14–18)
IG#: 0.05 K/UL (ref 0–0.02)
IMM GRANULOCYTES NFR BLD AUTO: 31.9 %
INR PPP: 1 (ref 0.9–1.1)
LIPASE: 221 U/L (ref 73–393)
LYMPHOCYTES # BLD: 3.17 K/UL (ref 1.2–3.4)
MCH RBC QN AUTO: 28.9 PG (ref 25–34)
MCHC RBC AUTO-ENTMCNC: 33.3 G/DL (ref 32–36)
MCV RBC AUTO: 86.7 FL (ref 80–100)
MONO ABS #: 0.73 K/UL (ref 0.11–0.59)
MONOCYTES NFR BLD: 7.4 %
NEUT ABS #: 5.73 K/UL (ref 1.4–6.5)
NEUTROPHILS # BLD AUTO: 1.8 %
NEUTROPHILS NFR BLD AUTO: 57.7 %
PMV BLD AUTO: 10.5 FL (ref 7.4–10.4)
POTASSIUM SERPL-SCNC: 4.1 MMOL/L (ref 3.5–5.1)
PROT SERPL-MCNC: 6 GM/DL (ref 6.4–8.2)
PTT PATIENT: 22.9 SECONDS (ref 21–31)
RED CELL DISTRIBUTION WIDTH CV: 14.6 % (ref 11.5–14.5)
RED CELL DISTRIBUTION WIDTH SD: 45.3 FL (ref 36.4–46.3)
SODIUM SERPL-SCNC: 142 MMOL/L (ref 136–145)
WBC # BLD AUTO: 9.93 K/UL (ref 4.8–10.8)

## 2018-08-16 RX ADMIN — LORAZEPAM PRN MG: 2 INJECTION INTRAMUSCULAR; INTRAVENOUS at 22:06

## 2018-08-16 RX ADMIN — DEXTROSE MONOHYDRATE, SODIUM CHLORIDE, AND POTASSIUM CHLORIDE SCH MLS/HR: 50; 9; 1.49 INJECTION, SOLUTION INTRAVENOUS at 22:32

## 2018-08-16 RX ADMIN — PANTOPRAZOLE SODIUM SCH MLS/HR: 40 INJECTION, POWDER, FOR SOLUTION INTRAVENOUS at 16:31

## 2018-08-16 RX ADMIN — ROPINIROLE HYDROCHLORIDE SCH MG: 0.25 TABLET, FILM COATED ORAL at 22:44

## 2018-08-16 RX ADMIN — CITALOPRAM HYDROBROMIDE SCH MG: 20 TABLET ORAL at 22:44

## 2018-08-16 RX ADMIN — PANTOPRAZOLE SODIUM SCH MLS/HR: 40 INJECTION, POWDER, FOR SOLUTION INTRAVENOUS at 21:44

## 2018-08-16 RX ADMIN — BUPROPION HYDROCHLORIDE SCH MG: 150 TABLET, EXTENDED RELEASE ORAL at 22:43

## 2018-08-16 RX ADMIN — PANTOPRAZOLE SODIUM SCH MLS/HR: 40 INJECTION, POWDER, FOR SOLUTION INTRAVENOUS at 16:59

## 2018-08-16 RX ADMIN — ALUMINUM ZIRCONIUM TRICHLOROHYDREX GLY SCH EA: 0.2 STICK TOPICAL at 23:01

## 2018-08-16 NOTE — DIAGNOSTIC IMAGING REPORT
CHEST ONE VIEW PORTABLE



CLINICAL HISTORY: GI bleed.    



COMPARISON STUDY:  Chest radiograph January 17, 2018.



FINDINGS: Lung volumes are normal. There is no pneumothorax or pleural effusion.

There is no consolidation to suggest pneumonia. Pulmonary vascularity is normal.

Appearance of the chest is unchanged. 



IMPRESSION:  No acute cardiopulmonary findings. 









Electronically signed by:  Vinicius Morelos M.D.

8/16/2018 3:48 PM



Dictated Date/Time:  8/16/2018 3:43 PM

## 2018-08-16 NOTE — HISTORY AND PHYSICAL
History & Physical


Date & Time of Service:


Aug 16, 2018 at 19:53


Chief Complaint:


Sob,Black Stools


Primary Care Physician:


Mateo Caballero M.D.


History of Present Illness


Source:  patient, hospital records, other


69 y/o M Hx HTN, ETOH abuse, GERD, esophageal dysmotility.  Had some abdominal 

fullness 3 days prior followed by onset of black stool.  On the day of 

admission he experience sweats and exertional dyspnea and presented to the ER.  

He has not had nausea/vomiting or fevers.  He admits to drinking a bottle of 

wine daily.


An initial Hb in the ER was 10 from a baseline of 15.





Past Medical/Surgical History


1) HTN


2) Alcohol abuse


3) Esophageal dysmotility


4) Restless leg syndrome


5) GERD


6) Anxiety disorder





Surgical:


1) H/O arthroscopy of right knee


2) History of Achilles tendon repair


3) History of bilateral carpal tunnel release


4) History of cataract surgery


5) History of left knee replacement


6) History of tonsillectomy





Family History





FH: cancer


FH: heart disease


FH: hypertension


Father  due to complications of Alzheimer's 


Mother  due to Melanoma





Social History


Quit smoking  - drinks a bottle of wine daily


Smoking Status:  Never Smoker


Marital Status:  


Occupational Status:  employed





Immunizations


History of Influenza Vaccine:  Yes


History of Tetanus Vaccine?:  Yes


History of Pneumococcal:  Unknown


History of Hepatitis B Vaccine:  No





Allergies


Coded Allergies:  


     NSAIDs (Verified  Allergy, Unknown, HISTORY OF ULCER, 18)


     Penicillins (Verified  Allergy, Unknown, UNSURE, 18)


Uncoded Allergies:  


     SULFA (Allergy, Unknown, UNSURE, WHEN CHILD, 17)





Home Medications


Scheduled


Aspirin (Aspirin Ec), 81 MG PO QAM


Bupropion HCl (Bupropion HCl Sr), 150 MG PO BID


Celecoxib (CeleBREX), 200 MG PO BID


Citalopram (Citalopram Hydrobromide), 30 MG PO HS


Cyclosporine (Ophth) (Restasis), 1 DROP OPB BID


Fluticasone Propionate (Nasal) (Flonase Allergy Relief), 1 SPRAY PARMJIT BID


Multiple Vitamins W/ Minerals (Multivitamin Adults 50+), 1 TAB PO DAILY


Ranitidine Hcl (Zantac), 150 MG PO HS


Ropinirole (Requip), 0.25-0.5 MG PO HS


Testosterone (Fortesta), 1 DOSE TOP QAM





Review of Systems


Constitutional:  + sweats, No fever, No chills


Eyes:  No worsening of vision


ENT:  No hearing loss, No nasal symptoms


Respiratory:  No cough, No sputum, No wheezing


Cardiovascular:  No chest pain, No orthopnea, No PND


Abdomen:  + pain, + GI bleeding, No nausea, No vomiting


Musculoskeletal:  No joint pain


Genitourinary - Male:  No hematuria, No dysuria


Neurologic:  No memory loss, No paralysis, No weakness


Psychiatric:  No depression symptoms


Endocrine:  No fatigue


Hematologic / Lymphatic:  + abnormal bleeding/bruising


Integumentary:  No rash


Allergic / Immunologic:  No environmental allergies





Physical Exam


Vital Signs











  Date Time  Temp Pulse Resp B/P (MAP) Pulse Ox O2 Delivery O2 Flow Rate FiO2


 


18 18:51    121/73    


 


18 18:43  73 16     


 


18 18:28  76 17     


 


18 18:13  76 22     


 


18 17:58  74 15     


 


18 17:43  78 16     


 


18 17:28  82 15     


 


18 17:13  82 29     


 


18 16:43  81 15     


 


18 16:35    114/61    


 


18 16:33  85 20 114/61    


 


18 16:28  86 16     


 


18 16:23  84 19     


 


18 16:08  89 20     


 


18 16:03  99      


 


18 15:48     98 Room Air  


 


18 14:34     99 Room Air  


 


18 14:29 37.2 104 18 122/78 99 Room Air  








General Appearance:  WD/WN, no apparent distress


Head:  normocephalic


Eyes:  normal inspection


ENT:  normal ENT inspection, pharynx normal


Neck:  supple, no JVD


Respiratory/Chest:  chest non-tender, lungs clear, normal breath sounds


Cardiovascular:  regular rate, rhythm, no edema, no gallop


Abdomen/GI:  normal bowel sounds, non tender, soft


Back:  normal inspection, no CVA tenderness


Extremities/Musculoskelatal:  normal inspection, no calf tenderness, normal 

capillary refill


Neurologic/Psych:  CNs II-XII nml as tested, no motor/sensory deficits, alert, 

oriented x 3


Skin:  normal color





Diagnostics


Laboratory Results





Results Past 24 Hours








Test


  8/16/18


15:43 18


16:50 Range/Units


 


 


White Blood Count 9.93  4.8-10.8  K/uL


 


Red Blood Count 3.46  4.7-6.1  M/uL


 


Hemoglobin 10.0  14.0-18.0  g/dL


 


Hematocrit 30.0  42-52  %


 


Mean Corpuscular Volume 86.7    fL


 


Mean Corpuscular Hemoglobin 28.9  25-34  pg


 


Mean Corpuscular Hemoglobin


Concent 33.3


  


  32-36  g/dl


 


 


Platelet Count 277  130-400  K/uL


 


Mean Platelet Volume 10.5  7.4-10.4  fL


 


Neutrophils (%) (Auto) 57.7   %


 


Lymphocytes (%) (Auto) 31.9   %


 


Monocytes (%) (Auto) 7.4   %


 


Eosinophils (%) (Auto) 1.8   %


 


Basophils (%) (Auto) 0.7   %


 


Neutrophils # (Auto) 5.73  1.4-6.5  K/uL


 


Lymphocytes # (Auto) 3.17  1.2-3.4  K/uL


 


Monocytes # (Auto) 0.73  0.11-0.59  K/uL


 


Eosinophils # (Auto) 0.18  0-0.5  K/uL


 


Basophils # (Auto) 0.07  0-0.2  K/uL


 


RDW Standard Deviation 45.3  36.4-46.3  fL


 


RDW Coefficient of Variation 14.6  11.5-14.5  %


 


Immature Granulocyte % (Auto) 0.5   %


 


Immature Granulocyte # (Auto) 0.05  0.00-0.02  K/uL


 


Prothrombin Time


  10.7


  


  9.0-12.0


SECONDS


 


Prothromb Time International


Ratio 1.0


  


  0.9-1.1  


 


 


Activated Partial


Thromboplast Time 22.9


  


  21.0-31.0


SECONDS


 


Partial Thromboplastin Ratio 0.9   


 


Sodium Level 142  136-145  mmol/L


 


Potassium Level 4.1  3.5-5.1  mmol/L


 


Chloride Level 109    mmol/L


 


Carbon Dioxide Level 26  21-32  mmol/L


 


Anion Gap 7.0  3-11  mmol/L


 


Blood Urea Nitrogen 31  7-18  mg/dl


 


Creatinine


  0.96


  


  0.60-1.40


mg/dl


 


Est Creatinine Clear Calc


Drug Dose 82.2


  


   ml/min


 


 


Estimated GFR (


American) 93.8


  


   


 


 


Estimated GFR (Non-


American 80.9


  


   


 


 


BUN/Creatinine Ratio 32.5  10-20  


 


Random Glucose 115  70-99  mg/dl


 


Calcium Level 7.9  8.5-10.1  mg/dl


 


Total Bilirubin 0.2  0.2-1  mg/dl


 


Direct Bilirubin < 0.1  0-0.2  mg/dl


 


Aspartate Amino Transf


(AST/SGOT) 24


  


  15-37  U/L


 


 


Alanine Aminotransferase


(ALT/SGPT) 25


  


  12-78  U/L


 


 


Alkaline Phosphatase 49    U/L


 


Troponin I < 0.015  0-0.045  ng/ml


 


Total Protein 6.0  6.4-8.2  gm/dl


 


Albumin 3.2  3.4-5.0  gm/dl


 


Lipase 221    U/L


 


Urine Color  YELLOW  


 


Urine Appearance  CLEAR CLEAR  


 


Urine pH  5.5 4.5-7.5  


 


Urine Specific Gravity  1.024 1.000-1.030  


 


Urine Protein  NEG NEG  


 


Urine Glucose (UA)  NEG NEG  


 


Urine Ketones  NEG NEG  


 


Urine Occult Blood  NEG NEG  


 


Urine Nitrite  NEG NEG  


 


Urine Bilirubin  NEG NEG  


 


Urine Urobilinogen  NEG NEG  


 


Urine Leukocyte Esterase  NEG NEG  











Impression


Assessment and Plan


69 y/o M Hx ETOH abuse, GERD, esophageal dysmotility.  Had some abdominal 

fullness 3 days prior followed by onset of black stool.  On the day of 

admission he experience sweats and exertional dyspnea and presented to the ER.  

He has not had nausea/vomiting or fevers.  He admits to drinking a bottle of 

wine daily.


An initial Hb in the ER was 10 from a baseline of 15.  





1) GI bleed - likely upper - placed on IV PPi - GI consulted - Hb will be 

trended.  The pt will be transfused due to active bleeding and symptoms.  





2) ETOH abuse - denies a history of withdrawal - PRN Ativan, thiamine and folic 

provided.  





3) Anxiety - cont Buspar





4) PLS - cont Ropinirole








Full code - SCDs 


Total time for this admit including review of labs, meds, imaging, records - 

discussion with pt and ER attending - 36 min





Resuscitation Status








VTE Prophylaxis


Will order VTE Prophylaxis:  Yes


Reason for no VTE drug order:  Contraindicated

## 2018-08-16 NOTE — EMERGENCY ROOM VISIT NOTE
History


Report prepared by Zahida:  Kacey Hester


Under the Supervision of:  Dr. Damian Judge M.D.


First contact with patient:  14:59


Chief Complaint:  SHORTNESS OF BREATH


Stated Complaint:  SOB,BLACK STOOLS


Nursing Triage Summary:  


increased shortness of breath over the past day or so. dark black stools for 

the 


past 3 days





History of Present Illness


The patient is a 68 year old male who presents to the Emergency Room with 

complaints of intermittent black stools for the past three days. He states that 

his bowel movements have been less frequent the past three days. He denies any 

blood in his stools. The patient reports that he feels "full" very easily. He 

also reports that he has recently had episodes of shortness of breath after 

walking short distances. He states that with these episodes he gets very hot, 

sweaty, and sometimes lightheaded. The patient also reports eating and drinking 

normally. He denies chest pain and abdominal pain with palpation. He states 

that he previously had ulcers. The patient states that he is on baby aspirin 

and Zantac. The patient states that he was a previous smoker.





   Source of History:  patient


   Onset:  3 days ago


   Position:  other (bowels)


   Quality:  other (black stools)


   Timing:  intermittent


   Associated Symptoms:  + diaphoresis (with shortness of breath ), + SOB, No 

chest pain, No abdominal pain





Review of Systems


See HPI for pertinent positives and negatives.  A total of ten systems were 

reviewed and were otherwise negative.





Past Medical & Surgical


Medical Problems:


(1) Anxiety State Nos


(2) Carpal Tunnel Syndrome


(3) Chondromalacia Patellae


(4) Chronic Sinusitis, Unspecified


(5) Coron Atheroscler Nos Type Vessel, Native Or Graft


(6) Depressive Disorder Nec


(7) Diab Maddy Wo Compl, Type Ii Or Unspec Type, Not Uncntrld


(8) Esophageal Reflux


(9) History of dental extraction


(10) Hypertension Nos


(11) Hypertension Nos


(12) Hypertrophy (Benign) Of Prostate W/O Urinary Obst & Oth Luts


(13) Rheumatoid Arthritis


Surgical Problems:


(1) H/O arthroscopy of right knee


(2) History of Achilles tendon repair


(3) History of bilateral carpal tunnel release


(4) History of cataract surgery


(5) History of left knee replacement


(6) History of tonsillectomy








Family History





FH: cancer


FH: heart disease


FH: hypertension





Social History


Smoking Status:  Never Smoker


Alcohol Use:  occasionally


Marital Status:  


Occupation Status:  employed





Current/Historical Medications


Scheduled


Aspirin (Aspirin Ec), 81 MG PO QAM


Bupropion HCl (Bupropion HCl Sr), 150 MG PO BID


Celecoxib (CeleBREX), 200 MG PO BID


Citalopram (Citalopram Hydrobromide), 30 MG PO HS


Cyclosporine (Ophth) (Restasis), 1 DROP OPB BID


Fluticasone Propionate (Nasal) (Flonase Allergy Relief), 1 SPRAY PARMJIT BID


Multiple Vitamins W/ Minerals (Multivitamin Adults 50+), 1 TAB PO DAILY


Ranitidine Hcl (Zantac), 150 MG PO HS


Ropinirole (Requip), 0.25-0.5 MG PO HS


Testosterone (Fortesta), 1 DOSE TOP QAM





Allergies


Coded Allergies:  


     NSAIDs (Verified  Allergy, Unknown, HISTORY OF ULCER, 7/23/18)


     Penicillins (Verified  Allergy, Unknown, UNSURE, 7/23/18)


Uncoded Allergies:  


     SULFA (Allergy, Unknown, UNSURE, WHEN CHILD, 11/1/17)





Physical Exam


Vital Signs











  Date Time  Temp Pulse Resp B/P (MAP) Pulse Ox O2 Delivery O2 Flow Rate FiO2


 


8/16/18 18:51    121/73    


 


8/16/18 18:43  73 16     


 


8/16/18 18:28  76 17     


 


8/16/18 18:13  76 22     


 


8/16/18 17:58  74 15     


 


8/16/18 17:43  78 16     


 


8/16/18 17:28  82 15     


 


8/16/18 17:13  82 29     


 


8/16/18 16:43  81 15     


 


8/16/18 16:35    114/61    


 


8/16/18 16:33  85 20 114/61    


 


8/16/18 16:28  86 16     


 


8/16/18 16:23  84 19     


 


8/16/18 16:08  89 20     


 


8/16/18 16:03  99      


 


8/16/18 15:48     98 Room Air  


 


8/16/18 14:34     99 Room Air  


 


8/16/18 14:29 37.2 104 18 122/78 99 Room Air  











Physical Exam


GENERAL: Awake, alert, well-appearing, in no distress


HENT: Normocephalic, atraumatic. Oropharynx unremarkable.


EYES: Normal conjunctiva. Sclera non-icteric.


NECK: Supple. No nuchal rigidity. 


RESPIRATORY: Clear to auscultation. No wheezes. Normal respiratory effort.


CARDIAC: Normal rate.  Normal rhythm. Extremities warm and well perfused.


GI: Soft, non-distended. No tenderness to palpation. No rebound or guarding. No 

masses.


RECTAL: Hemoccult positive. Melenic stools. No red blood. No hemorrhoid. 


MUSCULOSKELETAL: Atraumatic. Chest examination reveals no tenderness. 


LOWER EXTREMITIES: Calves are equal size bilaterally and non-tender. No edema. 

Left foot in compressions sock. 


NEURO: Normal sensorium. No sensory or motor deficits noted. No facial droop.


SKIN: Warm and dry. No rash or jaundice noted.





Medical Decision & Procedures


ER Provider


Diagnostic Interpretation:


Radiology results as stated below per my review and radiologist interpretation: 





CHEST ONE VIEW PORTABLE





CLINICAL HISTORY: GI bleed.    





COMPARISON STUDY:  Chest radiograph January 17, 2018.





FINDINGS: Lung volumes are normal. There is no pneumothorax or pleural effusion.


There is no consolidation to suggest pneumonia. Pulmonary vascularity is normal.


Appearance of the chest is unchanged. 





IMPRESSION:  No acute cardiopulmonary findings. 














Electronically signed by:  Vinicius Morelos M.D.


8/16/2018 3:48 PM





Dictated Date/Time:  8/16/2018 3:43 PM





Laboratory Results


8/16/18 15:43








Red Blood Count 3.46, Mean Corpuscular Volume 86.7, Mean Corpuscular Hemoglobin 

28.9, Mean Corpuscular Hemoglobin Concent 33.3, Mean Platelet Volume 10.5, 

Neutrophils (%) (Auto) 57.7, Lymphocytes (%) (Auto) 31.9, Monocytes (%) (Auto) 

7.4, Eosinophils (%) (Auto) 1.8, Basophils (%) (Auto) 0.7, Neutrophils # (Auto) 

5.73, Lymphocytes # (Auto) 3.17, Monocytes # (Auto) 0.73, Eosinophils # (Auto) 

0.18, Basophils # (Auto) 0.07





8/16/18 15:43

















Test


  8/16/18


15:43 8/16/18


16:50


 


White Blood Count


  9.93 K/uL


(4.8-10.8) 


 


 


Red Blood Count


  3.46 M/uL


(4.7-6.1) 


 


 


Hemoglobin


  10.0 g/dL


(14.0-18.0) 


 


 


Hematocrit 30.0 % (42-52)  


 


Mean Corpuscular Volume


  86.7 fL


() 


 


 


Mean Corpuscular Hemoglobin


  28.9 pg


(25-34) 


 


 


Mean Corpuscular Hemoglobin


Concent 33.3 g/dl


(32-36) 


 


 


Platelet Count


  277 K/uL


(130-400) 


 


 


Mean Platelet Volume


  10.5 fL


(7.4-10.4) 


 


 


Neutrophils (%) (Auto) 57.7 %  


 


Lymphocytes (%) (Auto) 31.9 %  


 


Monocytes (%) (Auto) 7.4 %  


 


Eosinophils (%) (Auto) 1.8 %  


 


Basophils (%) (Auto) 0.7 %  


 


Neutrophils # (Auto)


  5.73 K/uL


(1.4-6.5) 


 


 


Lymphocytes # (Auto)


  3.17 K/uL


(1.2-3.4) 


 


 


Monocytes # (Auto)


  0.73 K/uL


(0.11-0.59) 


 


 


Eosinophils # (Auto)


  0.18 K/uL


(0-0.5) 


 


 


Basophils # (Auto)


  0.07 K/uL


(0-0.2) 


 


 


RDW Standard Deviation


  45.3 fL


(36.4-46.3) 


 


 


RDW Coefficient of Variation


  14.6 %


(11.5-14.5) 


 


 


Immature Granulocyte % (Auto) 0.5 %  


 


Immature Granulocyte # (Auto)


  0.05 K/uL


(0.00-0.02) 


 


 


Prothrombin Time


  10.7 SECONDS


(9.0-12.0) 


 


 


Prothromb Time International


Ratio 1.0 (0.9-1.1) 


  


 


 


Activated Partial


Thromboplast Time 22.9 SECONDS


(21.0-31.0) 


 


 


Partial Thromboplastin Ratio 0.9  


 


Anion Gap


  7.0 mmol/L


(3-11) 


 


 


Est Creatinine Clear Calc


Drug Dose 82.2 ml/min 


  


 


 


Estimated GFR (


American) 93.8 


  


 


 


Estimated GFR (Non-


American 80.9 


  


 


 


BUN/Creatinine Ratio 32.5 (10-20)  


 


Calcium Level


  7.9 mg/dl


(8.5-10.1) 


 


 


Total Bilirubin


  0.2 mg/dl


(0.2-1) 


 


 


Direct Bilirubin


  < 0.1 mg/dl


(0-0.2) 


 


 


Aspartate Amino Transf


(AST/SGOT) 24 U/L (15-37) 


  


 


 


Alanine Aminotransferase


(ALT/SGPT) 25 U/L (12-78) 


  


 


 


Alkaline Phosphatase


  49 U/L


() 


 


 


Troponin I


  < 0.015 ng/ml


(0-0.045) 


 


 


Total Protein


  6.0 gm/dl


(6.4-8.2) 


 


 


Albumin


  3.2 gm/dl


(3.4-5.0) 


 


 


Lipase


  221 U/L


() 


 


 


Urine Color  YELLOW 


 


Urine Appearance  CLEAR (CLEAR) 


 


Urine pH  5.5 (4.5-7.5) 


 


Urine Specific Gravity


  


  1.024


(1.000-1.030)


 


Urine Protein  NEG (NEG) 


 


Urine Glucose (UA)  NEG (NEG) 


 


Urine Ketones  NEG (NEG) 


 


Urine Occult Blood  NEG (NEG) 


 


Urine Nitrite  NEG (NEG) 


 


Urine Bilirubin  NEG (NEG) 


 


Urine Urobilinogen  NEG (NEG) 


 


Urine Leukocyte Esterase  NEG (NEG) 





Laboratory results reviewed by me





Medications Administered











 Medications


  (Trade)  Dose


 Ordered  Sig/Jaya


 Route  Start Time


 Stop Time Status Last Admin


Dose Admin


 


 Sodium Chloride  1,000 ml @ 


 999 mls/hr  Q1H1M STAT


 IV  8/16/18 15:17


 8/16/18 16:17 DC 8/16/18 16:32


999 MLS/HR


 


 Pantoprazole


 Sodium 80 mg/


 Dextrose  120 ml @ 


 400 mls/hr  NOW


 IV  8/16/18 15:30


 9/15/18 15:29  8/16/18 16:59


400 MLS/HR











ECG Per My Interpretation


Indication:  SOB/dyspnea


Rate (beats per minute):  88


Rhythm:  normal sinus


Findings:  other (no ST elevation)


Comparison ECG Date:  10/24/2014


Change:  no significant change





ED Course


1501: The patient was evaluated in room C4. A complete history and physical 

exam was performed.





1517: Ordered Sodium Chloride 1000 ml @ 999 mls/hr IV. 





1530: Ordered Pantoprazole Sodium 80 mg/Dextrose 120 ml @ 400 mls/hr IV. 





1722: I discussed the case with Dr. [facility]ist he agreed 

to evaluate the patient further.





Medical Decision


Differential diagnosis:


Etiologies such as diverticulosis, AVM, coagulopathy, colitis, inflammatory 

bowel disease, malignancy, Ana Cristina-Baltazar tear, esophagitis, peptic ulcer disease

, variceal bleed, gastritis, epistaxis, fissure, hemorrhoids, as well as others 

were entertained.





Patient presents complaining of 3 days of melanotic stools with shortness of 

breath particular on exertion.  Intermittent diaphoresis.  No chest pain.  

States he has had a little bit of epigastric discomfort at times.  History of 

GI bleed distantly.  Has had regular alcohol use recently but not the last 

several days.  No hematemesis.  No bright red blood per rectum.  No NSAID use 

currently.  States he was drinking fairly regularly until 3 or 4 days ago.  Not 

actively in withdrawal.  Benign abdomen.  Well-appearing.  Hemoccult positive 

and likely experiencing GI bleed.  Hemoglobin is 4 points lower than 

approximately a year ago and I am concerned he is having a GI bleed causing his 

symptomatology.  Given Protonix here.   Feel that admission is required.  Will 

defer additional CT imaging of the abdomen pelvis at this time.  Given the 

symptomatically shortness of breath with exertion and melanotic stools feel 

that he requires admission for GI consultation, trending H/H, and possible 

scope.  Hospitalist contacted.





Medication Reconcilliation


Current Medication List:  was personally reviewed by me





Blood Pressure Screening


Patient's blood pressure:  Normal blood pressure





Impression





 Primary Impression:  


 GI bleed


 Additional Impression:  


 Dyspnea on exertion





Scribe Attestation


The scribe's documentation has been prepared under my direction and personally 

reviewed by me in its entirety. I confirm that the note above accurately 

reflects all work, treatment, procedures, and medical decision making performed 

by me.





Departure Information


Dispostion


Being Evaluated By Hospitalist





Referrals


Mateo Caballero M.D. (PCP)





Patient Instructions


My [facility]





Problem Qualifiers








 Primary Impression:  


 GI bleed


 GI bleed type/associated pathology:  unspecified gastrointestinal hemorrhage 

type  Qualified Codes:  K92.2 - Gastrointestinal hemorrhage, unspecified

## 2018-08-17 VITALS
DIASTOLIC BLOOD PRESSURE: 75 MMHG | TEMPERATURE: 97.7 F | HEART RATE: 64 BPM | OXYGEN SATURATION: 98 % | SYSTOLIC BLOOD PRESSURE: 155 MMHG

## 2018-08-17 VITALS
HEART RATE: 76 BPM | DIASTOLIC BLOOD PRESSURE: 80 MMHG | SYSTOLIC BLOOD PRESSURE: 126 MMHG | TEMPERATURE: 97.34 F | OXYGEN SATURATION: 97 %

## 2018-08-17 VITALS
OXYGEN SATURATION: 97 % | SYSTOLIC BLOOD PRESSURE: 126 MMHG | DIASTOLIC BLOOD PRESSURE: 80 MMHG | TEMPERATURE: 97.88 F | HEART RATE: 76 BPM

## 2018-08-17 VITALS
OXYGEN SATURATION: 96 % | DIASTOLIC BLOOD PRESSURE: 72 MMHG | SYSTOLIC BLOOD PRESSURE: 125 MMHG | HEART RATE: 88 BPM | TEMPERATURE: 99.14 F

## 2018-08-17 VITALS
HEART RATE: 76 BPM | DIASTOLIC BLOOD PRESSURE: 72 MMHG | OXYGEN SATURATION: 100 % | SYSTOLIC BLOOD PRESSURE: 128 MMHG | TEMPERATURE: 98.24 F

## 2018-08-17 VITALS
TEMPERATURE: 98.24 F | SYSTOLIC BLOOD PRESSURE: 120 MMHG | DIASTOLIC BLOOD PRESSURE: 72 MMHG | HEART RATE: 67 BPM | OXYGEN SATURATION: 96 %

## 2018-08-17 VITALS
DIASTOLIC BLOOD PRESSURE: 83 MMHG | OXYGEN SATURATION: 95 % | SYSTOLIC BLOOD PRESSURE: 130 MMHG | HEART RATE: 75 BPM | TEMPERATURE: 98.24 F

## 2018-08-17 VITALS
OXYGEN SATURATION: 98 % | SYSTOLIC BLOOD PRESSURE: 118 MMHG | TEMPERATURE: 98.06 F | DIASTOLIC BLOOD PRESSURE: 73 MMHG | HEART RATE: 72 BPM

## 2018-08-17 VITALS
OXYGEN SATURATION: 92 % | TEMPERATURE: 98.06 F | HEART RATE: 75 BPM | SYSTOLIC BLOOD PRESSURE: 131 MMHG | DIASTOLIC BLOOD PRESSURE: 71 MMHG

## 2018-08-17 VITALS
DIASTOLIC BLOOD PRESSURE: 79 MMHG | OXYGEN SATURATION: 99 % | SYSTOLIC BLOOD PRESSURE: 134 MMHG | TEMPERATURE: 97.7 F | HEART RATE: 69 BPM

## 2018-08-17 VITALS
OXYGEN SATURATION: 98 % | HEART RATE: 68 BPM | DIASTOLIC BLOOD PRESSURE: 67 MMHG | TEMPERATURE: 98.42 F | SYSTOLIC BLOOD PRESSURE: 107 MMHG

## 2018-08-17 VITALS
DIASTOLIC BLOOD PRESSURE: 70 MMHG | TEMPERATURE: 98.24 F | OXYGEN SATURATION: 98 % | SYSTOLIC BLOOD PRESSURE: 115 MMHG | HEART RATE: 78 BPM

## 2018-08-17 VITALS
HEART RATE: 65 BPM | DIASTOLIC BLOOD PRESSURE: 74 MMHG | OXYGEN SATURATION: 99 % | TEMPERATURE: 97.7 F | SYSTOLIC BLOOD PRESSURE: 118 MMHG

## 2018-08-17 PROCEDURE — 0DJ08ZZ INSPECTION OF UPPER INTESTINAL TRACT, VIA NATURAL OR ARTIFICIAL OPENING ENDOSCOPIC: ICD-10-PCS | Performed by: INTERNAL MEDICINE

## 2018-08-17 PROCEDURE — 3E0G8GC INTRODUCTION OF OTHER THERAPEUTIC SUBSTANCE INTO UPPER GI, VIA NATURAL OR ARTIFICIAL OPENING ENDOSCOPIC: ICD-10-PCS | Performed by: INTERNAL MEDICINE

## 2018-08-17 RX ADMIN — FOLIC ACID SCH MLS/MIN: 5 INJECTION, SOLUTION INTRAMUSCULAR; INTRAVENOUS; SUBCUTANEOUS at 08:15

## 2018-08-17 RX ADMIN — CITALOPRAM HYDROBROMIDE SCH MG: 20 TABLET ORAL at 20:45

## 2018-08-17 RX ADMIN — CYCLOSPORINE SCH DROPS: 0.5 EMULSION OPHTHALMIC at 12:29

## 2018-08-17 RX ADMIN — BUPROPION HYDROCHLORIDE SCH MG: 150 TABLET, EXTENDED RELEASE ORAL at 08:15

## 2018-08-17 RX ADMIN — PANTOPRAZOLE SODIUM SCH MLS/HR: 40 INJECTION, POWDER, FOR SOLUTION INTRAVENOUS at 01:20

## 2018-08-17 RX ADMIN — THIAMINE HYDROCHLORIDE SCH MLS/MIN: 100 INJECTION, SOLUTION INTRAMUSCULAR; INTRAVENOUS at 08:15

## 2018-08-17 RX ADMIN — PANTOPRAZOLE SODIUM SCH MLS/HR: 40 INJECTION, POWDER, FOR SOLUTION INTRAVENOUS at 16:29

## 2018-08-17 RX ADMIN — PANTOPRAZOLE SODIUM SCH MLS/HR: 40 INJECTION, POWDER, FOR SOLUTION INTRAVENOUS at 07:08

## 2018-08-17 RX ADMIN — PANTOPRAZOLE SODIUM SCH MLS/HR: 40 INJECTION, POWDER, FOR SOLUTION INTRAVENOUS at 21:07

## 2018-08-17 RX ADMIN — DEXTROSE MONOHYDRATE, SODIUM CHLORIDE, AND POTASSIUM CHLORIDE SCH MLS/HR: 50; 9; 1.49 INJECTION, SOLUTION INTRAVENOUS at 16:55

## 2018-08-17 RX ADMIN — LORAZEPAM PRN MG: 2 INJECTION INTRAMUSCULAR; INTRAVENOUS at 21:10

## 2018-08-17 RX ADMIN — CYCLOSPORINE SCH DROPS: 0.5 EMULSION OPHTHALMIC at 20:46

## 2018-08-17 RX ADMIN — ROPINIROLE HYDROCHLORIDE SCH MG: 0.25 TABLET, FILM COATED ORAL at 20:45

## 2018-08-17 RX ADMIN — BUPROPION HYDROCHLORIDE SCH MG: 150 TABLET, EXTENDED RELEASE ORAL at 20:45

## 2018-08-17 RX ADMIN — PANTOPRAZOLE SODIUM SCH MLS/HR: 40 INJECTION, POWDER, FOR SOLUTION INTRAVENOUS at 12:29

## 2018-08-17 RX ADMIN — ALUMINUM ZIRCONIUM TRICHLOROHYDREX GLY SCH EA: 0.2 STICK TOPICAL at 08:00

## 2018-08-17 NOTE — DISCHARGE INSTRUCTIONS
Endoscopy Patient Instructions


Date / Procedure(s) Performed


Aug 17, 2018.


EGD





Allergy Information


Coded Allergies:  


     NSAIDs (Verified  Allergy, Unknown, HISTORY OF ULCER, 7/23/18)


     Penicillins (Verified  Allergy, Unknown, UNSURE, 7/23/18)


Uncoded Allergies:  


     SULFA (Allergy, Unknown, UNSURE, WHEN CHILD, 11/1/17)





Discharge Date / Findings


Aug 17, 2018.


polyps , hemorrhoids





Medication Instructions


Restart Stopped Medication(s):


resume meds





Reported Home Medications








 Medications  Dose


 Route/Sig


 Max Daily Dose Days Date Category Dose


Instructions


 


 Fortesta


  (Testosterone) 10


 Mg/Act Gel  1 Dose


 TOP QAM


    8/16/18 Reported  5 PUMPS


 


 Zantac


  (Ranitidine Hcl)


 150 Mg Tab  150 Mg


 PO HS


    8/16/18 Reported 


 


 Multivitamin


 Adults 50+


  (Multiple


 Vitamins W/


 Minerals) 1 Tab


 Tab  1 Tab


 PO DAILY


    8/16/18 Reported 


 


 Bupropion HCl Sr


  (Bupropion HCl)


 150 Mg Tabcr  150 Mg


 PO BID


    8/16/18 Reported 


 


 Requip


  (Ropinirole HCl)


 0.25 Mg Tab  0.25-0.5 Mg


 PO HS


    7/16/18 Reported 


 


 Restasis


  (Cyclosporine


  (Ophth)) 0.05 %


 Emu  1 Drop


 OPB BID


    7/16/18 Reported 


 


 Flonase Allergy


 Relief


  (Fluticasone


 Propionate


  (Nasal)) 50


 Mcg/Act Spr  1 Spray


 PARMJIT BID


    10/31/17 Reported 


 


 Citalopram


 Hydrobromide


  (Citalopram) 20


 Mg Tab  30 Mg


 PO HS


    10/31/17 Reported 


 


 CeleBREX


  (Celecoxib) 200


 Mg Cap  200 Mg


 PO BID


    10/31/17 Reported 


 


 Aspirin Ec


  (Aspirin) 81 Mg


 Tab  81 Mg


 PO QAM


    10/31/17 Reported 











Provider Instructions





Activity Restrictions





-  No exercising or heavy lifting for 24 hours. 


-  Do not drink alcohol the day of the procedure.


-  Do not drive a car or operate machinery until the day after the procedure.


-  Do not make any important decisions or sign important papers in 24 hours 

after the procedure.





Following Day:





-  Return to full activity which may include returning to work/school.





Diet





Start your diet with liquids and light foods (jello, soup, juice, toast).  Then 

eat your usual diet if not nauseated.





Treatment For Common After Affects





For mild abdominal pain, bloating, or excessive gas:





-  Rest


-  Eat lightly


-  Lie on right side





Follow-Up Information


Follow-up with  as scheduled





Anesthesia Information





What You Should Know





You have had a procedure that required some medicine to reduce anxiety and 

discomfort. This treatment is called moderate sedation.  


After receiving the treatment, you may be sleepy, but you will be able to 

breathe on your own.  The effects of the treatment may last for several hours.








Follow these instructions along with Activity/Diet recommendations noted above:





*  Do NOT do anything where dizziness or clumsiness would be dangerous.





*  Rest quietly at home today, then you can be up and about tomorrow.





*  Have a responsible person stay with you the rest of today.





*  You may have had an I.V. today.  If so, you may take the dressing off later 

today.





Recommendations


 


Call your doctor if:





*  Trouble breathing 





*  Continuous vomiting for more than 24 hours








*  Temperature above 101 degrees





*  Severe abdominal pain or bloating





*  Pain not relieved by pain medicine ordered





*  There is increased drainage or redness from any incision





*  A large amount of rectal bleeding greater than 2-3 tablespoons. 


   (If you had a polyp/s removed or have hemorrhoids, a small amount of blood -


    from the rectum is to be expected.)





*  You have any unanswered questions or concerns.








IN THE EVENT OF A SERIOUS EMERGENCY, GO TO THE NEAREST EMERGENCY ROOM








       Your discharge instructions were prepared by provider Virgilio Pickens.





 Patient Instructions Signature Page














Jason Newton 











Patient (or Guardian) Signature/Date:____________________________________ I 

have read and understand the instructions given to me by my caregivers.








Caregiver/RN/Doctor Signature/Date:____________________________________











The above-named patient and/or guardian has received patient instructions on 

this date.





























+  Original Patient Signature Page (only) stays with chart.  Please make copy 

for patient.

## 2018-08-17 NOTE — GI REPORT
Patient Name: Jason Newton

Procedure Date: 8/17/2018 2:10 PM

MRN: [id number]

Account Number: [id number]

YOB: 1950

Admit Type: Inpatient

Age: 68

Gender: Male

Attending MD: Virgilio Pickens MD

Procedure:            Upper GI endoscopy

Providers:            Virgilio Pickens MD

Referring MD:         Melo Gonzalez Md

Indications:          Acute post hemorrhagic anemia, Melena

Medicines:            Propofol total dose 680 mg IV, Lidocaine 80 mg IV

Complications:        No immediate complications.

Estimated Blood Loss: Estimated blood loss: none.

Procedure:            Pre-Anesthesia Assessment:

                      - Prior to the procedure, a History and Physical was 

                      performed, and patient medications, allergies and 

                      sensitivities were reviewed. The patient's tolerance of 

                      previous anesthesia was reviewed.

                      - The risks and benefits of the procedure and the 

                      sedation options and risks were discussed with the 

                      patient. All questions were answered and informed 

                      consent was obtained.

                      After obtaining informed consent, the endoscope was 

                      passed under direct vision. Throughout the procedure, 

                      the patient's blood pressure, pulse, and oxygen 

                      saturations were monitored continuously. The Scope was 

                      introduced through the mouth, and advanced to the 

                      second part of duodenum. The upper GI endoscopy was 

                      accomplished without difficulty. The patient tolerated 

                      the procedure fairly well.

Findings:

     The Z-line was regular and was found 38 cm from the incisors.

     The examined esophagus was normal.

     The entire examined stomach was normal.

     One non-bleeding cratered duodenal ulcer with a visible vessel was found 

     in the duodenal bulb. The lesion was 12 mm in largest dimension. Area 

     was successfully injected with 2 mL of a 1:10,000 solution of 

     epinephrine for drug delivery. Coagulation for bleeding prevention using 

     bipolar probe was successful. For hemostasis, one hemostatic clip was 

     successfully placed (MR conditional). There was no bleeding during, or 

     at the end, of the procedure.

Impression:           - Z-line regular, 38 cm from the incisors.

                      - Normal esophagus.

                      - Normal stomach.

                      - One non-bleeding duodenal ulcer with a visible 

                      vessel. Injected. Treated with bipolar cautery. Clip 

                      (MR conditional) was placed.

                      - No specimens collected.

Recommendation:       - Return patient to [facility] for [facility] care.

Virgilio Pickens M.D.

Virgilio Pickens MD

8/17/2018 2:41:08 PM

This report has been signed electronically.

Note Initiated On: 8/17/2018 2:10 PM

Number of Addenda: 0

     I attest to the content of the Intraoperative Record and orders 

     documented therein, exceptions below



{[id number]}

## 2018-08-17 NOTE — DISCHARGE INSTRUCTIONS
Endoscopy Patient Instructions


Date / Procedure(s) Performed


Aug 17, 2018.


EGD





Allergy Information


Coded Allergies:  


     NSAIDs (Verified  Allergy, Unknown, HISTORY OF ULCER, 7/23/18)


     Penicillins (Verified  Allergy, Unknown, UNSURE, 7/23/18)


Uncoded Allergies:  


     SULFA (Allergy, Unknown, UNSURE, WHEN CHILD, 11/1/17)





Discharge Date / Findings


Aug 17, 2018.


Duodenal ulcer





Medication Instructions


Restart Stopped Medication(s):


resume meds





Current Inpatient Medications








 Medications


  (Trade)  Dose


 Ordered  Sig/Jaya


 Route  Start Time


 Stop Time Status Last Admin


Dose Admin


 


 Bupropion HCl


  (Wellbutrin-Sr


 Tab)  150 mg  BID


 PO  8/16/18 21:00


 9/15/18 20:59  8/17/18 08:15


150 MG


 


 Citalopram


 Hydrobromide


  (celeXA TAB)  30 mg  HS


 PO  8/16/18 21:00


 9/15/18 20:59  8/16/18 22:44


30 MG


 


 Ropinirole HCl


  (Requip Tab)  0.5 mg  HS


 PO  8/16/18 21:00


 9/15/18 20:59  8/16/18 22:44


0.5 MG


 


 Ondansetron HCl


  (Zofran Inj)  4 mg  Q6H  PRN


 IV  8/16/18 19:30


 9/15/18 19:29   


 


 


 Morphine Sulfate


  (MoRPHine


 SULFATE INJ)  2 mg  Q30M  PRN


 IV  8/16/18 19:30


 8/30/18 19:29   


 


 


 Lorazepam


  (Ativan Inj)  1 mg  Q2H  PRN


 IV  8/16/18 19:30


 9/15/18 19:29   


 


 


 Lorazepam


  (Ativan Inj)  2 mg  Q2H  PRN


 IV  8/16/18 19:30


 9/15/18 19:29  8/16/18 22:06


2 MG


 


 Thiamine HCl 100


 mg/Syringe  10 ml @ 2


 mls/min  QAM


 IV  8/17/18 09:00


 9/16/18 08:59  8/17/18 08:15


2 MLS/MIN


 


 Folic Acid 1 mg/


 Syringe  10 ml @ 5


 mls/min  QAM


 IV  8/17/18 09:00


 9/16/18 08:59  8/17/18 08:15


5 MLS/MIN


 


 Potassium


 Chloride/Dextrose/


 Sod Cl  1,000 ml @ 


 80 mls/hr  [id number]


 IV  8/16/18 22:00


 8/17/18 22:59  8/16/18 22:32


80 MLS/HR


 


 Pantoprazole


 Sodium 40 mg/


 Dextrose  100 ml @ 


 20 mls/hr  Q5H


 IV  8/17/18 01:20


 9/16/18 01:19  8/17/18 12:29


20 MLS/HR


 


 Lorazepam 1 mg/


 Syringe  1 ml @ 1


 mls/min  Q2H  PRN


 IV  8/16/18 22:00


 9/15/18 21:59   


 


 


 Lorazepam 2 mg/


 Syringe  2 ml @ 1


 mls/min  Q2H  PRN


 IV  8/16/18 22:15


 9/15/18 22:14   


 


 


 Lorazepam


  (Ativan Tab)  PRN Dosing


 -Active


 Protocol  UD  PRN


 PO  8/17/18 00:15


 9/16/18 00:14   


 


 


 Cyclosporine


  (Restasis)  1 drops  BID


 OPB  8/17/18 09:00


 9/16/18 08:59  8/17/18 12:29


1 DROPS











Provider Instructions





Activity Restrictions





-  No exercising or heavy lifting for 24 hours. 


-  Do not drink alcohol the day of the procedure.


-  Do not drive a car or operate machinery until the day after the procedure.


-  Do not make any important decisions or sign important papers in 24 hours 

after the procedure.





Following Day:





-  Return to full activity which may include returning to work/school.





Diet





Start your diet with liquids and light foods (jello, soup, juice, toast).  Then 

eat your usual diet if not nauseated.





Treatment For Common After Affects





For mild abdominal pain, bloating, or excessive gas:





-  Rest


-  Eat lightly


-  Lie on right side


stop aspirin, bland diet





Follow-Up Information


Follow-up with  as scheduled





Anesthesia Information





What You Should Know





You have had a procedure that required some medicine to reduce anxiety and 

discomfort. This treatment is called moderate sedation.  


After receiving the treatment, you may be sleepy, but you will be able to 

breathe on your own.  The effects of the treatment may last for several hours.








Follow these instructions along with Activity/Diet recommendations noted above:





*  Do NOT do anything where dizziness or clumsiness would be dangerous.





*  Rest quietly at home today, then you can be up and about tomorrow.





*  Have a responsible person stay with you the rest of today.





*  You may have had an I.V. today.  If so, you may take the dressing off later 

today.





Recommendations


 


Call your doctor if:





*  Trouble breathing 





*  Continuous vomiting for more than 24 hours








*  Temperature above 101 degrees





*  Severe abdominal pain or bloating





*  Pain not relieved by pain medicine ordered





*  There is increased drainage or redness from any incision





*  A large amount of rectal bleeding greater than 2-3 tablespoons. 


   (If you had a polyp/s removed or have hemorrhoids, a small amount of blood -


    from the rectum is to be expected.)





*  You have any unanswered questions or concerns.








IN THE EVENT OF A SERIOUS EMERGENCY, GO TO THE NEAREST EMERGENCY ROOM








       Your discharge instructions were prepared by provider Virgilio Pickens.





 Patient Instructions Signature Page














Jason Newton 











Patient (or Guardian) Signature/Date:____________________________________ I 

have read and understand the instructions given to me by my caregivers.








Caregiver/RN/Doctor Signature/Date:____________________________________











The above-named patient and/or guardian has received patient instructions on 

this date.





























+  Original Patient Signature Page (only) stays with chart.  Please make copy 

for patient.

## 2018-08-17 NOTE — CLINICAL DOCUMENTATION QUERY
SHAJI GILLESPIE :



**** CLINICAL DOCUMENTATION QUERY****



Patient is a 68 year old male with ETOH abuse admitted for evaluation of GI bleeding. 
Noted is an initial hemoglobin of "10 from a baseline of 15".  He was placed on a PPI 
infusion and GI was consulted.  He will be monitored with serial hematology and was 
transfused 2 units of PRBC's thus far.  As appropriate, consider documentation as 
suggested below as this impacts accurate DRG assignment.  Thank you. 



In your clinical opinion is this patient being managed for:

    

                        (  x) Acute blood loss anemia

                        (  ) Not Agree



                        (  ) Other explanation of clinical findings (No explanation is 
considered a No Response)

                        (  ) Unable to determine 

                        (  ) Need to Discuss (Phone CDS or qliq) (No discussion is 
considered a No Response)

                                             

The medical record reflects the following clinical findings, treatment, and risk factors.  
  



Clinical Indicators: As above

Treatment: As above

Risk Factors: Age, ETOH, ASA



Please clarify and document your clinical opinion in the progress notes and discharge 
summary. Terms such as "probable", "suspected", "likely", "questionable", "possible", or 
"still to be ruled out" are acceptable. 



*****IF IN AGREEMENT, YOU MUST DOCUMENT ABOVE DIAGNOSTIC STATEMENT IN DAILY PROGRESS NOTES 
AND DISCHARGE SUMMARY. This document is not part of the patient's record.*****

Thank You, Itz Rice, [phone]

## 2018-08-17 NOTE — GASTROINTESTINAL CONSULTATION
DATE OF CONSULTATION:  2018

 

REASON FOR EVALUATION:  Melena and shortness of breath.

 

HISTORY OF PRESENT ILLNESS:  The patient is a 68-year-old male who presented

to the hospital with a 5-day history of melena and progressively worsening

dyspnea with exertion.  He was also having diaphoresis, but no abdominal

pain, nausea or vomiting.  He presented to the ER and was found to have a

hemoglobin of 10 down from a baseline of 15.  He drinks a bottle of wine a

day and takes a baby aspirin, but is also on a proton pump inhibitor. 

Actually, he is taking ranitidine 150 mg a day.  GI consultation was

obtained.  The patient did receive 2 units of red blood cells overnight and

feels better.

 

PAST MEDICAL HISTORY:  Remarkable for hypertension, alcohol use, restless leg

syndrome, anxiety.

 

PAST SURGICAL HISTORY:  Knee arthroscopy, tendon repair of Achilles, carpal

tunnel release bilaterally, cataract surgery, left knee replacement,

tonsillectomy.

 

MEDICATIONS:  Baby aspirin, bupropion, Celebrex, citalopram, Restasis,

Flonase, multiple vitamins, Zantac, Requip.

 

ALLERGIES:  NONSTEROIDALS, PENICILLIN, AND SULFA.

 

FAMILY HISTORY:  Positive for cancer, heart disease, hypertension.  Father

had Alzheimer's.  Mother  of a melanoma.

 

SOCIAL HISTORY:  The patient is .  He is employed.  Does not smoke. 

Drinks a bottle of wine a day.

 

REVIEW OF SYSTEMS:  Positive for fatigue.

 

PHYSICAL EXAMINATION:

GENERAL:  The patient is overweight, in no acute distress.

VITAL SIGNS:  Blood pressure is 121/73, pulse 73.

ABDOMEN:  Protuberant.  It is soft and nontender.

 

IMPRESSION:  The patient has melena and anemia, most likely from an upper GI

source, probably from the aspirin.  The patient will be scheduled for an EGD

for further evaluation.

 

 

 

[facility]

## 2018-08-17 NOTE — HOSPITALIST PROGRESS NOTE
Hospitalist Progress Note


Date of Service


Aug 17, 2018.





Subjective


Pt evaluation today including:  conversation w/ patient, conversation w/ family 

(wife at bedside), physical exam, chart review, lab review, review of studies, 

review of inpatient medication list


Pain:  None


PO Intake:  NPO


Voiding:  no voiding problems


Patient reports feeling well.  He denies any shortness of breath at rest but 

does complain of dyspnea on exertion.  He did experience some lightheadedness 

this morning getting up to the bathroom.  He denies any chest pain or abdominal 

pain.  He states he has a history of gastric ulcers.  He normally follows with 

Dr. Pickens.  The patient denies fevers, chills, sweats, chest pain, palpitations

, claudication, cough, wheezing, nausea, vomiting, abdominal pain, dysuria, 

hematuria, urinary retention, paralysis, weakness, numbness and tingling.





   Additional Comments:


See HPI for pertinent positives and negatives.  All other systems reviewed and 

negative.





Objective


Vital Signs











  Date Time  Temp Pulse Resp B/P (MAP) Pulse Ox O2 Delivery O2 Flow Rate FiO2


 


8/17/18 06:29 36.5 64 18 155/75 (101) 98 Room Air  


 


8/17/18 04:30 36.7 72 16 118/73 98   


 


8/17/18 03:30 36.7 75 18 131/71 92   


 


8/17/18 03:00 36.8 67 16 120/72 96   


 


8/17/18 02:48 36.8 78 16 115/70 98   


 


8/17/18 01:45 36.6 76 16 126/80 97  0.0 


 


8/17/18 00:45 36.3 76 17 126/80 97   


 


8/17/18 00:15 36.5 69 16 134/79 99   


 


8/17/18 00:00      Room Air  


 


8/16/18 23:58 36.9 77 16 133/75 100   


 


8/16/18 22:10 36.9 69 18 137/65 (89) 100 Room Air  


 


8/16/18 21:56 36.8 66 18 143/92 100 Room Air  


 


8/16/18 21:55 36.8 66 18 143/92 (109) 100 Room Air  


 


8/16/18 20:26  64 17 135/84    


 


8/16/18 20:11  70 19     


 


8/16/18 19:56  69 12     


 


8/16/18 19:41  77 18     


 


8/16/18 19:32    130/84    


 


8/16/18 19:26  71 16     


 


8/16/18 19:11  77 25     


 


8/16/18 18:56  79 19     


 


8/16/18 18:51    121/73    


 


8/16/18 18:43  73 16     


 


8/16/18 18:28  76 17     


 


8/16/18 18:13  76 22     


 


8/16/18 17:58  74 15     


 


8/16/18 17:43  78 16     


 


8/16/18 17:28  82 15     


 


8/16/18 17:13  82 29     


 


8/16/18 16:43  81 15     


 


8/16/18 16:35    114/61    


 


8/16/18 16:33  85 20 114/61    


 


8/16/18 16:28  86 16     


 


8/16/18 16:23  84 19     


 


8/16/18 16:08  89 20     


 


8/16/18 16:03  99      


 


8/16/18 15:48     98 Room Air  


 


8/16/18 14:34     99 Room Air  


 


8/16/18 14:29 37.2 104 18 122/78 99 Room Air  











Physical Exam


Notes:


General appearance:  +Obese.  Well-developed, well-nourished, no apparent 

distress


Head:  Normocephalic, atraumatic


Eyes:  Normal inspection, PERRL, EOMI


ENT:  Normal ENT inspection, hearing grossly normal, pharynx normal


Neck:  Supple, no JVD, trachea midline


Respiratory/Chest:  Lungs clear to auscultation, normal breath sounds, no 

respiratory distress


Cardiovascular:  Regular rate & rhythm, no gallop, no murmur


Abdomen/GI:  Normal bowel sounds, non-tender, soft


Extremities/Musculoskeletal:  Normal inspection, no calf tenderness, no pedal 

edema


Neurological/Psych:  Alert, normal mood/affect, oriented x 3


Skin:  Normal color, warm/dry, no rash





Laboratory Results





Last 24 Hours








Test


  8/16/18


15:43 8/16/18


16:50 8/16/18


23:34 8/17/18


06:13


 


White Blood Count 9.93 K/uL    


 


Red Blood Count 3.46 M/uL    


 


Hemoglobin 10.0 g/dL   8.8 g/dL  10.0 g/dL 


 


Hematocrit 30.0 %   27.2 %  


 


Mean Corpuscular Volume 86.7 fL    


 


Mean Corpuscular Hemoglobin 28.9 pg    


 


Mean Corpuscular Hemoglobin


Concent 33.3 g/dl 


  


  


  


 


 


Platelet Count 277 K/uL    


 


Mean Platelet Volume 10.5 fL    


 


Neutrophils (%) (Auto) 57.7 %    


 


Lymphocytes (%) (Auto) 31.9 %    


 


Monocytes (%) (Auto) 7.4 %    


 


Eosinophils (%) (Auto) 1.8 %    


 


Basophils (%) (Auto) 0.7 %    


 


Neutrophils # (Auto) 5.73 K/uL    


 


Lymphocytes # (Auto) 3.17 K/uL    


 


Monocytes # (Auto) 0.73 K/uL    


 


Eosinophils # (Auto) 0.18 K/uL    


 


Basophils # (Auto) 0.07 K/uL    


 


RDW Standard Deviation 45.3 fL    


 


RDW Coefficient of Variation 14.6 %    


 


Immature Granulocyte % (Auto) 0.5 %    


 


Immature Granulocyte # (Auto) 0.05 K/uL    


 


Prothrombin Time 10.7 SECONDS    


 


Prothromb Time International


Ratio 1.0 


  


  


  


 


 


Activated Partial


Thromboplast Time 22.9 SECONDS 


  


  


  


 


 


Partial Thromboplastin Ratio 0.9    


 


Sodium Level 142 mmol/L    


 


Potassium Level 4.1 mmol/L    


 


Chloride Level 109 mmol/L    


 


Carbon Dioxide Level 26 mmol/L    


 


Anion Gap 7.0 mmol/L    


 


Blood Urea Nitrogen 31 mg/dl    


 


Creatinine 0.96 mg/dl    


 


Est Creatinine Clear Calc


Drug Dose 82.2 ml/min 


  


  


  


 


 


Estimated GFR (


American) 93.8 


  


  


  


 


 


Estimated GFR (Non-


American 80.9 


  


  


  


 


 


BUN/Creatinine Ratio 32.5    


 


Random Glucose 115 mg/dl    


 


Calcium Level 7.9 mg/dl    


 


Total Bilirubin 0.2 mg/dl    


 


Direct Bilirubin < 0.1 mg/dl    


 


Aspartate Amino Transf


(AST/SGOT) 24 U/L 


  


  


  


 


 


Alanine Aminotransferase


(ALT/SGPT) 25 U/L 


  


  


  


 


 


Alkaline Phosphatase 49 U/L    


 


Troponin I < 0.015 ng/ml    


 


Total Protein 6.0 gm/dl    


 


Albumin 3.2 gm/dl    


 


Lipase 221 U/L    


 


Urine Color  YELLOW   


 


Urine Appearance  CLEAR   


 


Urine pH  5.5   


 


Urine Specific Gravity  1.024   


 


Urine Protein  NEG   


 


Urine Glucose (UA)  NEG   


 


Urine Ketones  NEG   


 


Urine Occult Blood  NEG   


 


Urine Nitrite  NEG   


 


Urine Bilirubin  NEG   


 


Urine Urobilinogen  NEG   


 


Urine Leukocyte Esterase  NEG   


 


Test


  8/17/18


08:29 


  


  


 


 


Hemoglobin 10.6 g/dL    











Assessment and Plan


67 y/o male with a history of HTN, alcohol abuse, esophageal dysmotility, 

peptic ulcer disease, and GERD who presents w/melena and dyspnea on exertion.





GI bleed, likely upper, acute blood loss anemia--stable


-Admit to telemetry.  No acute events overnight.  Pt in sinus rhythm with HR 60s

-80s.


-GI consulted, appreciate recs: Plan for EGD today.


-NPO


-D5NSS + 20 mEq KCl at 80 cc/hr


-Continue Protonix drip


-Hgb was 10 on admission, down from baseline of 15


-Hgb trended down to 8.8, given 2 units PRBC as symptomatic.  Hgb now up to 10.6


-Pt states he no longer takes NSAIDs due to PUD but ASA and Celebrex on home 

med list.  Will hold these





HTN--stable, does not take meds for this





Alcohol abuse--reportedly drinks bottle of wine a day


-Alcohol withdrawal protocol


-Ativan prn


-Continue thiamine 100 mg IV qd and folic acid 1 mg IV qd


-Encourage cessation





Anxiety/depression


-Continue Wellbutrin 150 mg PO BID and Celexa 30 mg PO hs





RLS


-Continue Requip 0.5 mg PO hs





DVT prophylaxis


-Hold chemical ppx due to acute GI bleed and procedure


-SCDs





Code Status


-Level I, FULL RESUSCITATION STATUS

## 2018-08-17 NOTE — ENDO HISTORY AND PHYSICAL
History & Physical


Date of Service:


Aug 17, 2018.


Chief Complaint:


melena


Referring Physician:


Dr Gonsalez


History of Present Illness


For EGD





Past Surgical History


Hx Cardiac Surgery:  Yes (HEART CATH AND NO STENT)


Hx Abdominal Surgery:  No


Hx Post-Op Nausea and Vomiting:  No


Hx Cancer Surgery:  No


Hx Thoracic Surgery:  No


Hx Orthopedic:  Yes (LEFT TKA, LEFT ACHILLES, R/L CTR, RECONSTRUCTIVE RIGHT 

THUMB)


Hx Urinary Tract Surgery:  No





Social History


Smoking Status:  Former Smoker


Hx Substance Use:  No


Hx Alcohol Use:  Yes (1 bottle of wine per day per H&P)





Allergies


Coded Allergies:  


     NSAIDs (Verified  Allergy, Unknown, HISTORY OF ULCER, 7/23/18)


     Penicillins (Verified  Allergy, Unknown, UNSURE, 7/23/18)


Uncoded Allergies:  


     SULFA (Allergy, Unknown, UNSURE, WHEN CHILD, 11/1/17)





Current Medications





Reported Home Medications








 Medications  Dose


 Route/Sig


 Max Daily Dose Days Date Category Dose


Instructions


 


 Fortesta


  (Testosterone) 10


 Mg/Act Gel  1 Dose


 TOP QAM


    8/16/18 Reported  5 PUMPS


 


 Zantac


  (Ranitidine Hcl)


 150 Mg Tab  150 Mg


 PO HS


    8/16/18 Reported 


 


 Multivitamin


 Adults 50+


  (Multiple


 Vitamins W/


 Minerals) 1 Tab


 Tab  1 Tab


 PO DAILY


    8/16/18 Reported 


 


 Bupropion HCl Sr


  (Bupropion HCl)


 150 Mg Tabcr  150 Mg


 PO BID


    8/16/18 Reported 


 


 Requip


  (Ropinirole HCl)


 0.25 Mg Tab  0.25-0.5 Mg


 PO HS


    7/16/18 Reported 


 


 Restasis


  (Cyclosporine


  (Ophth)) 0.05 %


 Emu  1 Drop


 OPB BID


    7/16/18 Reported 


 


 Flonase Allergy


 Relief


  (Fluticasone


 Propionate


  (Nasal)) 50


 Mcg/Act Spr  1 Spray


 PARMJIT BID


    10/31/17 Reported 


 


 Citalopram


 Hydrobromide


  (Citalopram) 20


 Mg Tab  30 Mg


 PO HS


    10/31/17 Reported 


 


 CeleBREX


  (Celecoxib) 200


 Mg Cap  200 Mg


 PO BID


    10/31/17 Reported 


 


 Aspirin Ec


  (Aspirin) 81 Mg


 Tab  81 Mg


 PO QAM


    10/31/17 Reported 











Vital Signs


Weight (Kilograms):  97.200


Height (Feet):  5


Height (Inches):  7.00











  Date Time  Temp Pulse Resp B/P (MAP) Pulse Ox O2 Delivery O2 Flow Rate FiO2


 


8/17/18 13:26 36.8 70 18 135/76 (95) 97 Room Air  


 


8/17/18 11:24 36.8 76 16 128/72 (90) 100   


 


8/17/18 08:00      Room Air  


 


8/17/18 06:29 36.5 64 18 155/75 (101) 98 Room Air  


 


8/17/18 04:30 36.7 72 16 118/73 98   


 


8/17/18 03:30 36.7 75 18 131/71 92   


 


8/17/18 03:00 36.8 67 16 120/72 96   


 


8/17/18 02:48 36.8 78 16 115/70 98   


 


8/17/18 01:45 36.6 76 16 126/80 97  0.0 


 


8/17/18 00:45 36.3 76 17 126/80 97   


 


8/17/18 00:15 36.5 69 16 134/79 99   


 


8/17/18 00:00      Room Air  


 


8/16/18 23:58 36.9 77 16 133/75 100   


 


8/16/18 22:10 36.9 69 18 137/65 (89) 100 Room Air  


 


8/16/18 21:56 36.8 66 18 143/92 100 Room Air  


 


8/16/18 21:55 36.8 66 18 143/92 (109) 100 Room Air  


 


8/16/18 20:26  64 17 135/84    


 


8/16/18 20:11  70 19     


 


8/16/18 19:56  69 12     


 


8/16/18 19:41  77 18     


 


8/16/18 19:32    130/84    


 


8/16/18 19:26  71 16     


 


8/16/18 19:11  77 25     


 


8/16/18 18:56  79 19     


 


8/16/18 18:51    121/73    


 


8/16/18 18:43  73 16     


 


8/16/18 18:28  76 17     


 


8/16/18 18:13  76 22     


 


8/16/18 17:58  74 15     


 


8/16/18 17:43  78 16     


 


8/16/18 17:28  82 15     


 


8/16/18 17:13  82 29     


 


8/16/18 16:43  81 15     


 


8/16/18 16:35    114/61    


 


8/16/18 16:33  85 20 114/61    


 


8/16/18 16:28  86 16     


 


8/16/18 16:23  84 19     


 


8/16/18 16:08  89 20     


 


8/16/18 16:03  99      


 


8/16/18 15:48     98 Room Air  


 


8/16/18 14:34     99 Room Air  


 


8/16/18 14:29 37.2 104 18 122/78 99 Room Air  











Physical Exam


General Appearance:  + obese


Respiratory/Chest:  


   Respiratory effort:  no dyspnea


Cardiovascular:  


   Heart Auscultation:  RRR


Abdomen:  


   Inspection & Palpation:  soft





Assessment and Plan


Melena for EGD

## 2018-08-17 NOTE — ANESTHESIOLOGY PROGRESS NOTE
Anesthesia Post Op Note


Date & Time


Aug 17, 2018 at 15:02





Vital Signs


Pain Intensity:  0.0





Vital Signs Past 12 Hours








  Date Time  Temp Pulse Resp B/P (MAP) Pulse Ox O2 Delivery O2 Flow Rate FiO2


 


8/17/18 14:57  68 16 125/78 (94) 97 Room Air  


 


8/17/18 14:42 36.8 68 16 133/75 (94) 95 Room Air  


 


8/17/18 13:26 36.8 70 18 135/76 (95) 97 Room Air  


 


8/17/18 11:24 36.8 76 16 128/72 (90) 100   


 


8/17/18 08:00      Room Air  


 


8/17/18 06:29 36.5 64 18 155/75 (101) 98 Room Air  


 


8/17/18 04:30 36.7 72 16 118/73 98   


 


8/17/18 03:30 36.7 75 18 131/71 92   











Notes


Mental Status:  alert / awake / arousable, participated in evaluation


Pt Amnestic to Procedure:  Yes


Nausea / Vomiting:  adequately controlled


Pain:  adequately controlled


Airway Patency, RR, SpO2:  stable & adequate


BP & HR:  stable & adequate


Hydration State:  stable & adequate


Anesthetic Complications:  no major complications apparent

## 2018-08-18 VITALS
HEART RATE: 76 BPM | SYSTOLIC BLOOD PRESSURE: 121 MMHG | DIASTOLIC BLOOD PRESSURE: 77 MMHG | OXYGEN SATURATION: 95 % | TEMPERATURE: 98.6 F

## 2018-08-18 VITALS
OXYGEN SATURATION: 95 % | HEART RATE: 76 BPM | SYSTOLIC BLOOD PRESSURE: 121 MMHG | DIASTOLIC BLOOD PRESSURE: 77 MMHG | TEMPERATURE: 98.6 F

## 2018-08-18 VITALS
OXYGEN SATURATION: 95 % | TEMPERATURE: 98.06 F | DIASTOLIC BLOOD PRESSURE: 72 MMHG | HEART RATE: 73 BPM | SYSTOLIC BLOOD PRESSURE: 145 MMHG

## 2018-08-18 VITALS
TEMPERATURE: 98.06 F | SYSTOLIC BLOOD PRESSURE: 145 MMHG | DIASTOLIC BLOOD PRESSURE: 84 MMHG | OXYGEN SATURATION: 96 % | HEART RATE: 77 BPM

## 2018-08-18 LAB
BUN SERPL-MCNC: 10 MG/DL (ref 7–18)
CALCIUM SERPL-MCNC: 8.1 MG/DL (ref 8.5–10.1)
CO2 SERPL-SCNC: 25 MMOL/L (ref 21–32)
CREAT SERPL-MCNC: 0.87 MG/DL (ref 0.6–1.4)
EOSINOPHIL NFR BLD AUTO: 247 K/UL (ref 130–400)
GLUCOSE SERPL-MCNC: 96 MG/DL (ref 70–99)
HCT VFR BLD CALC: 32.7 % (ref 42–52)
HGB BLD-MCNC: 10.7 G/DL (ref 14–18)
MCH RBC QN AUTO: 28.9 PG (ref 25–34)
MCHC RBC AUTO-ENTMCNC: 32.7 G/DL (ref 32–36)
MCV RBC AUTO: 88.4 FL (ref 80–100)
NRBC BLD AUTO-RTO: 0.2 %
NUCLEATED RED BLOOD CELL ABS: 0.02 K/UL (ref 0–0)
PMV BLD AUTO: 10 FL (ref 7.4–10.4)
POTASSIUM SERPL-SCNC: 4.2 MMOL/L (ref 3.5–5.1)
RED CELL DISTRIBUTION WIDTH CV: 15.1 % (ref 11.5–14.5)
RED CELL DISTRIBUTION WIDTH SD: 46.9 FL (ref 36.4–46.3)
SODIUM SERPL-SCNC: 139 MMOL/L (ref 136–145)
WBC # BLD AUTO: 9.27 K/UL (ref 4.8–10.8)

## 2018-08-18 RX ADMIN — CYCLOSPORINE SCH DROPS: 0.5 EMULSION OPHTHALMIC at 08:35

## 2018-08-18 RX ADMIN — PANTOPRAZOLE SODIUM SCH MLS/HR: 40 INJECTION, POWDER, FOR SOLUTION INTRAVENOUS at 02:08

## 2018-08-18 RX ADMIN — BUPROPION HYDROCHLORIDE SCH MG: 150 TABLET, EXTENDED RELEASE ORAL at 08:35

## 2018-08-18 RX ADMIN — PANTOPRAZOLE SODIUM SCH MLS/HR: 40 INJECTION, POWDER, FOR SOLUTION INTRAVENOUS at 12:20

## 2018-08-18 RX ADMIN — FOLIC ACID SCH MLS/MIN: 5 INJECTION, SOLUTION INTRAMUSCULAR; INTRAVENOUS; SUBCUTANEOUS at 08:36

## 2018-08-18 RX ADMIN — PANTOPRAZOLE SODIUM SCH MLS/HR: 40 INJECTION, POWDER, FOR SOLUTION INTRAVENOUS at 07:14

## 2018-08-18 RX ADMIN — THIAMINE HYDROCHLORIDE SCH MLS/MIN: 100 INJECTION, SOLUTION INTRAMUSCULAR; INTRAVENOUS at 08:36

## 2018-08-18 NOTE — DISCHARGE INSTRUCTIONS
Discharge Instructions


Date of Service


Aug 18, 2018.





Admission


Reason for Admission:  Gi Bleed





Discharge


Discharge Diagnosis / Problem:  Upper gastrointestinal bleed, duodenal ulcer





Discharge Goals


Goal(s):  Decrease discomfort, Improve function, Diagnostic testing, 

Therapeutic intervention





Activity Recommendations


Activity Limitations:  resume your previous activity





.





Instructions / Follow-Up


Instructions / Follow-Up


You were admitted to the hospital with black stools (melena) and suspected 

upper gastrointestinal bleed.  You were evaluated by gastroenterology who 

performed an upper endoscopy (EGD).  This revealed a duodenal ulcer that was no 

longer actively bleeding.  This was cauterized and clipped.  As you are now 

tolerating a regular diet and have not had any more active bleeding, you are 

now medically stable for discharge.





Medications:





*Please stop both aspirin and Celebrex as these are NSAID medications that 

likely caused your ulcer.  Please avoid future NSAID use if possible.





*Please take pantoprazole (Protonix) 40 mg daily.  This is a medication to help 

heal your ulcer.  You will need to take this medication for at least 8 weeks.  

A prescription for the first month has been sent to your pharmacy.





*Continue your other home medications as prescribed.





Follow up:





*You will be scheduled to follow up with your primary care provider, Dr. Caballero, 

within 1-2 weeks of discharge.  Your primary care provider can follow up with 

H. Pylori testing if needed.  This was not completed while inpatient as it is a 

stool test and you did not move your bowels again.





Please seek medical attention if you experience fevers, chills, sweats, 

dizziness/lightheadedness, loss of consciousness, chest pain, shortness of 

breath, nausea, vomiting, bloody or black stools, numbness or tingling.





Current Hospital Diet


Patient's current hospital diet: Regular Diet





Discharge Diet


Recommended Diet:  AHA Diet (Heart Healthy)





Procedures


Procedures Performed:  


EGD, HEMOSTASIS





Pending Studies


Studies pending at discharge:  no





Medical Emergencies








.


Who to Call and When:





Medical Emergencies:  If at any time you feel your situation is an emergency, 

please call 911 immediately.





.





Non-Emergent Contact


Non-Emergency issues call your:  Primary Care Provider


Call Non-Emergent contact if:  you have a fever, your pain is not controlled, 

your pain is worsening, your pain is unusual for you, your pain is concerning 

you, you have any medication questions





.


.








"Provider Documentation" section prepared by Vanda Carrillo.








.

## 2018-08-18 NOTE — DISCHARGE SUMMARY
Discharge Summary


Date of Service


Aug 18, 2018.





Discharge Summary


Admission Date:


Aug 16, 2018 at 19:36


Discharge Date:  Aug 18, 2018


Discharge Disposition:  Home


Principal Diagnosis:  Upper GI bleed, duodenal ulcer


Problems/Secondary Diagnoses:


HTN, alcohol abuse, esophageal dysmotility, peptic ulcer disease, GERD


Immunizations:  


   Have You Had Influenza Vaccine:  Yes


   History of Tetanus Vaccine?:  Yes


   History of Pneumococcal:  Unknown


   History of Hepatitis B Vaccine:  No


Procedures:


Procedure:            Upper GI endoscopy


Providers:            Virgilio Pickens MD


Referring MD:         Melo Gonzalez Md


Indications:          Acute post hemorrhagic anemia, Melena


Medicines:            Propofol total dose 680 mg IV, Lidocaine 80 mg IV


Complications:        No immediate complications.


Estimated Blood Loss: Estimated blood loss: none.


Procedure:            Pre-Anesthesia Assessment:


                      - Prior to the procedure, a History and Physical was 


                      performed, and patient medications, allergies and 


                      sensitivities were reviewed. The patient's tolerance of 


                      previous anesthesia was reviewed.


                      - The risks and benefits of the procedure and the 


                      sedation options and risks were discussed with the 


                      patient. All questions were answered and informed 


                      consent was obtained.


                      After obtaining informed consent, the endoscope was 


                      passed under direct vision. Throughout the procedure, 


                      the patient's blood pressure, pulse, and oxygen 


                      saturations were monitored continuously. The Scope was 


                      introduced through the mouth, and advanced to the 


                      second part of duodenum. The upper GI endoscopy was 


                      accomplished without difficulty. The patient tolerated 


                      the procedure fairly well.


Findings:


     The Z-line was regular and was found 38 cm from the incisors.


     The examined esophagus was normal.


     The entire examined stomach was normal.


     One non-bleeding cratered duodenal ulcer with a visible vessel was found 


     in the duodenal bulb. The lesion was 12 mm in largest dimension. Area 


     was successfully injected with 2 mL of a 1:10,000 solution of 


     epinephrine for drug delivery. Coagulation for bleeding prevention using 


     bipolar probe was successful. For hemostasis, one hemostatic clip was 


     successfully placed (MR conditional). There was no bleeding during, or 


     at the end, of the procedure.


Impression:           - Z-line regular, 38 cm from the incisors.


                      - Normal esophagus.


                      - Normal stomach.


                      - One non-bleeding duodenal ulcer with a visible 


                      vessel. Injected. Treated with bipolar cautery. Clip 


                      (MR conditional) was placed.


                      - No specimens collected.


Recommendation:       - Return patient to [facility] for ongoing care.


Consultations:


GI





Medication Reconciliation


New Medications:  


Pantoprazole Sodium (Protonix) 40 Mg Tab


1 TAB PO DAILY for 30 Days, #30 TAB





 


Continued Medications:  


Bupropion HCl (Bupropion HCl Sr) 150 Mg Tabcr


150 MG PO BID





Citalopram (Citalopram Hydrobromide) 20 Mg Tab


30 MG PO HS





Cyclosporine (Ophth) (Restasis) 0.05 % Emu


1 DROP OPB BID, BTL





Fluticasone Propionate (Nasal) (Flonase Allergy Relief) 50 Mcg/Act Spr


1 SPRAY PARMJIT BID





Multiple Vitamins W/ Minerals (Multivitamin Adults 50+) 1 Tab Tab


1 TAB PO DAILY





Ranitidine Hcl (Zantac) 150 Mg Tab


150 MG PO HS, TAB





Ropinirole (Requip) 0.25 Mg Tab


0.25-0.5 MG PO HS, TAB





Testosterone (Fortesta) 10 Mg/Act Gel


1 DOSE TOP QAM


5 PUMPS


 


Discontinued Medications:  


Aspirin (Aspirin Ec) 81 Mg Tab


81 MG PO QAM





Celecoxib (CeleBREX) 200 Mg Cap


200 MG PO BID, CAP











Discharge Exam


Patient reports feeling well. He is tolerating a regular diet without issue.  

He has not had a bowel movement since he was first in the ER and has not had 

any signs/symptoms of bleeding.  He denies any chest pain, palpitations, 

shortness of breath, or dizziness.  The patient denies fevers, chills, sweats, 

chest pain, palpitations, claudication, cough, wheezing, shortness of breath, 

nausea, vomiting, abdominal pain, dysuria, hematuria, urinary retention, 

paralysis, weakness, numbness and tingling.





Constitutional:  No fever, No chills, No sweats


Eyes:  No worsening of vision, No eye pain, No diplopia


ENT:  No hearing loss, No nasal symptoms, No trouble swallowing


Respiratory:  No cough, No wheezing, No shortness of breath


Cardiovascular:  No chest pain, No claudication, No palpitations


Abdomen:  No pain, No nausea, No vomiting


Musculoskeletal:  No joint pain, No muscle pain, No swelling


Genitourinary - Male:  No dysuria, No urinary retention, No hematuria


Neurologic:  No paralysis, No weakness, No numbness/tingling


Integumentary:  No rash, No itch, No color change





General appearance:  +Obese.  Well-developed, well-nourished, no apparent 

distress


Head:  Normocephalic, atraumatic


Eyes:  Normal inspection, PERRL, EOMI


ENT:  Normal ENT inspection, hearing grossly normal, pharynx normal


Neck:  Supple, no JVD, trachea midline


Respiratory/Chest:  Lungs clear to auscultation, normal breath sounds, no 

respiratory distress


Cardiovascular:  Regular rate & rhythm, no gallop, no murmur


Abdomen/GI:  Normal bowel sounds, non-tender, soft


Extremities/Musculoskeletal:  Normal inspection, no calf tenderness, no pedal 

edema


Neurological/Psych:  Alert, normal mood/affect, oriented x 3


Skin:  Normal color, warm/dry, no rash





Hospital Course


69 y/o male with a history of HTN, alcohol abuse, esophageal dysmotility, 

peptic ulcer disease, and GERD who presents w/melena and dyspnea on exertion.





GI bleed, likely upper, duodenal ulcer, acute blood loss anemia--stable


-Admit to telemetry.  No acute events overnight.  Pt in sinus rhythm with HR 70s

-80s.


-GI consulted, appreciate recs: Ulcer likely related to aspirin use, would 

discontinue.


-EGD shows one non-bleeding duodenal ulcer w/visible vessel.  Injected w/

epinephrine and treated with bipolar cautery.  1 MR conditional clip placed for 

hemostasis.  


-Hgb 10.7 on 8/18, stable from 10.6.  Received 2 units PRCB this admission


-Pt admits to taking Celebrex BID and baby ASA at home.  Advised to stop these


-D/C'd home w/Protonix.  Will need at least 8 weeks to treat ulcer


-H. Pylori stool antigen ordered but not collected as no further BMs





HTN--stable, does not take meds for this





Alcohol abuse--reportedly drinks bottle of wine a day


-Alcohol withdrawal protocol


-Ativan prn


-Continue thiamine 100 mg IV qd and folic acid 1 mg IV qd


-Encourage cessation





Anxiety/depression


-Continue Wellbutrin 150 mg PO BID and Celexa 30 mg PO hs





RLS


-Continue Requip 0.5 mg PO hs





DVT prophylaxis


-Hold chemical ppx due to acute GI bleed/ulcer


-SCDs





Code Status


-Level I, FULL RESUSCITATION STATUS


Total Time Spent:  Greater than 30 minutes


This includes examination of the patient, discharge planning, medication 

reconciliation, and communication with other providers.





Discharge Instructions


Please refer to the electronic Patient Visit Report (Discharge Instructions) 

for additional information.





Follow-Up


PCP





Additional Copies To


Mateo Caballero M.D.

## 2022-01-21 NOTE — DIAGNOSTIC IMAGING REPORT
L-SPINE MIN 4 VIEWS ROUTINE



CLINICAL HISTORY: Low back pain    



COMPARISON STUDY:  No previous studies for comparison.



FINDINGS: There are moderate multilevel degenerative changes with multilevel

disc space narrowing. No acute fractures are visualized. No destructive lesions

are evident. There is a mild S-shaped scoliosis.



IMPRESSION:  Moderate multilevel degenerative change. No acute fractures or

subluxations identified. 









Electronically signed by:  Andrew Pabon M.D.

7/31/2017 3:24 PM



Dictated Date/Time:  7/31/2017 3:24 PM
RIGHT SHOULDER MIN 2 VIEWS ROUTINE



CLINICAL HISTORY: M25.511 Right shoulder [id number]

Right pain



COMPARISON: None.



DISCUSSION: Moderate degenerative changes of glenohumeral joint. Moderate

flattening of the articular services of the humerus. Mild peripheral osteophytic

reaction. Moderate degenerative change acromioclavicular joint with no

significant subluxation. There are no abnormal soft tissue calcifications. Bony

mineralization is normal. There is no evidence for soft tissue swelling.



IMPRESSION: Moderate degenerative change primarily of the articular services of

the humeral head. No acute process.











The above report was generated using voice recognition software.  It may contain

grammatical, syntax or spelling errors.







Electronically signed by:  Dipak Mccoy M.D.

7/31/2017 3:25 PM



Dictated Date/Time:  7/31/2017 3:24 PM
normal